# Patient Record
Sex: FEMALE | Race: WHITE | NOT HISPANIC OR LATINO | Employment: PART TIME | URBAN - METROPOLITAN AREA
[De-identification: names, ages, dates, MRNs, and addresses within clinical notes are randomized per-mention and may not be internally consistent; named-entity substitution may affect disease eponyms.]

---

## 2023-03-06 LAB
ALANINE AMINOTRANSFERASE (SGPT) (U/L) IN SER/PLAS: 16 U/L (ref 7–45)
ALBUMIN (G/DL) IN SER/PLAS: 4.3 G/DL (ref 3.4–5)
ASPARTATE AMINOTRANSFERASE (SGOT) (U/L) IN SER/PLAS: 18 U/L (ref 9–39)
BASOPHILS (10*3/UL) IN BLOOD BY AUTOMATED COUNT: 0.03 X10E9/L (ref 0–0.1)
BASOPHILS/100 LEUKOCYTES IN BLOOD BY AUTOMATED COUNT: 0.4 % (ref 0–2)
C REACTIVE PROTEIN (MG/L) IN SER/PLAS: 1.07 MG/DL
CREATININE (MG/DL) IN SER/PLAS: 1.04 MG/DL (ref 0.5–1.05)
EOSINOPHILS (10*3/UL) IN BLOOD BY AUTOMATED COUNT: 0.09 X10E9/L (ref 0–0.7)
EOSINOPHILS/100 LEUKOCYTES IN BLOOD BY AUTOMATED COUNT: 1.1 % (ref 0–6)
ERYTHROCYTE DISTRIBUTION WIDTH (RATIO) BY AUTOMATED COUNT: 13 % (ref 11.5–14.5)
ERYTHROCYTE MEAN CORPUSCULAR HEMOGLOBIN CONCENTRATION (G/DL) BY AUTOMATED: 32 G/DL (ref 32–36)
ERYTHROCYTE MEAN CORPUSCULAR VOLUME (FL) BY AUTOMATED COUNT: 93 FL (ref 80–100)
ERYTHROCYTES (10*6/UL) IN BLOOD BY AUTOMATED COUNT: 4.15 X10E12/L (ref 4–5.2)
GFR FEMALE: 58 ML/MIN/1.73M2
HEMATOCRIT (%) IN BLOOD BY AUTOMATED COUNT: 38.8 % (ref 36–46)
HEMOGLOBIN (G/DL) IN BLOOD: 12.4 G/DL (ref 12–16)
IMMATURE GRANULOCYTES/100 LEUKOCYTES IN BLOOD BY AUTOMATED COUNT: 1 % (ref 0–0.9)
LEUKOCYTES (10*3/UL) IN BLOOD BY AUTOMATED COUNT: 7.9 X10E9/L (ref 4.4–11.3)
LYMPHOCYTES (10*3/UL) IN BLOOD BY AUTOMATED COUNT: 1.4 X10E9/L (ref 1.2–4.8)
LYMPHOCYTES/100 LEUKOCYTES IN BLOOD BY AUTOMATED COUNT: 17.7 % (ref 13–44)
MONOCYTES (10*3/UL) IN BLOOD BY AUTOMATED COUNT: 0.6 X10E9/L (ref 0.1–1)
MONOCYTES/100 LEUKOCYTES IN BLOOD BY AUTOMATED COUNT: 7.6 % (ref 2–10)
NEUTROPHILS (10*3/UL) IN BLOOD BY AUTOMATED COUNT: 5.72 X10E9/L (ref 1.2–7.7)
NEUTROPHILS/100 LEUKOCYTES IN BLOOD BY AUTOMATED COUNT: 72.2 % (ref 40–80)
NRBC (PER 100 WBCS) BY AUTOMATED COUNT: 0 /100 WBC (ref 0–0)
PLATELETS (10*3/UL) IN BLOOD AUTOMATED COUNT: 233 X10E9/L (ref 150–450)
SEDIMENTATION RATE, ERYTHROCYTE: 54 MM/H (ref 0–30)
UREA NITROGEN (MG/DL) IN SER/PLAS: 27 MG/DL (ref 6–23)

## 2023-06-30 LAB
ALANINE AMINOTRANSFERASE (SGPT) (U/L) IN SER/PLAS: 14 U/L (ref 7–45)
ALBUMIN (G/DL) IN SER/PLAS: 4.2 G/DL (ref 3.4–5)
ALKALINE PHOSPHATASE (U/L) IN SER/PLAS: 103 U/L (ref 33–136)
ANION GAP IN SER/PLAS: 13 MMOL/L (ref 10–20)
ASPARTATE AMINOTRANSFERASE (SGOT) (U/L) IN SER/PLAS: 17 U/L (ref 9–39)
BASOPHILS (10*3/UL) IN BLOOD BY AUTOMATED COUNT: 0.05 X10E9/L (ref 0–0.1)
BASOPHILS/100 LEUKOCYTES IN BLOOD BY AUTOMATED COUNT: 0.5 % (ref 0–2)
BILIRUBIN TOTAL (MG/DL) IN SER/PLAS: 0.3 MG/DL (ref 0–1.2)
C REACTIVE PROTEIN (MG/L) IN SER/PLAS: 0.99 MG/DL
CALCIUM (MG/DL) IN SER/PLAS: 9.3 MG/DL (ref 8.6–10.6)
CARBON DIOXIDE, TOTAL (MMOL/L) IN SER/PLAS: 28 MMOL/L (ref 21–32)
CHLORIDE (MMOL/L) IN SER/PLAS: 103 MMOL/L (ref 98–107)
COMPLEMENT C3 (MG/DL) IN SER/PLAS: 161 MG/DL (ref 87–200)
COMPLEMENT C4 (MG/DL) IN SER/PLAS: 41 MG/DL (ref 10–50)
CREATININE (MG/DL) IN SER/PLAS: 1.14 MG/DL (ref 0.5–1.05)
EOSINOPHILS (10*3/UL) IN BLOOD BY AUTOMATED COUNT: 0.11 X10E9/L (ref 0–0.7)
EOSINOPHILS/100 LEUKOCYTES IN BLOOD BY AUTOMATED COUNT: 1 % (ref 0–6)
ERYTHROCYTE DISTRIBUTION WIDTH (RATIO) BY AUTOMATED COUNT: 13.2 % (ref 11.5–14.5)
ERYTHROCYTE MEAN CORPUSCULAR HEMOGLOBIN CONCENTRATION (G/DL) BY AUTOMATED: 30.8 G/DL (ref 32–36)
ERYTHROCYTE MEAN CORPUSCULAR VOLUME (FL) BY AUTOMATED COUNT: 97 FL (ref 80–100)
ERYTHROCYTES (10*6/UL) IN BLOOD BY AUTOMATED COUNT: 3.84 X10E12/L (ref 4–5.2)
GFR FEMALE: 52 ML/MIN/1.73M2
GLUCOSE (MG/DL) IN SER/PLAS: 105 MG/DL (ref 74–99)
HEMATOCRIT (%) IN BLOOD BY AUTOMATED COUNT: 37.3 % (ref 36–46)
HEMOGLOBIN (G/DL) IN BLOOD: 11.5 G/DL (ref 12–16)
IMMATURE GRANULOCYTES/100 LEUKOCYTES IN BLOOD BY AUTOMATED COUNT: 0.5 % (ref 0–0.9)
LEUKOCYTES (10*3/UL) IN BLOOD BY AUTOMATED COUNT: 11 X10E9/L (ref 4.4–11.3)
LYMPHOCYTES (10*3/UL) IN BLOOD BY AUTOMATED COUNT: 1.68 X10E9/L (ref 1.2–4.8)
LYMPHOCYTES/100 LEUKOCYTES IN BLOOD BY AUTOMATED COUNT: 15.3 % (ref 13–44)
MONOCYTES (10*3/UL) IN BLOOD BY AUTOMATED COUNT: 0.79 X10E9/L (ref 0.1–1)
MONOCYTES/100 LEUKOCYTES IN BLOOD BY AUTOMATED COUNT: 7.2 % (ref 2–10)
NEUTROPHILS (10*3/UL) IN BLOOD BY AUTOMATED COUNT: 8.31 X10E9/L (ref 1.2–7.7)
NEUTROPHILS/100 LEUKOCYTES IN BLOOD BY AUTOMATED COUNT: 75.5 % (ref 40–80)
NRBC (PER 100 WBCS) BY AUTOMATED COUNT: 0 /100 WBC (ref 0–0)
PLATELETS (10*3/UL) IN BLOOD AUTOMATED COUNT: 237 X10E9/L (ref 150–450)
POTASSIUM (MMOL/L) IN SER/PLAS: 4.4 MMOL/L (ref 3.5–5.3)
PROTEIN TOTAL: 7.1 G/DL (ref 6.4–8.2)
SODIUM (MMOL/L) IN SER/PLAS: 140 MMOL/L (ref 136–145)
UREA NITROGEN (MG/DL) IN SER/PLAS: 29 MG/DL (ref 6–23)

## 2023-07-01 LAB
CREATININE (MG/DL) IN URINE: 106 MG/DL (ref 20–320)
PROTEIN (MG/DL) IN URINE: 10 MG/DL (ref 5–24)
PROTEIN/CREATININE (MG/MG) IN URINE: 0.09 MG/MG CREAT (ref 0–0.17)
SEDIMENTATION RATE, ERYTHROCYTE: 36 MM/H (ref 0–30)

## 2023-07-02 LAB — ANTI-DNA (DS): 6 IU/ML

## 2023-07-06 LAB
6-ACETYLMORPHINE: <25 NG/ML
7-AMINOCLONAZEPAM: <25 NG/ML
ALPHA-HYDROXYALPRAZOLAM: <25 NG/ML
ALPHA-HYDROXYMIDAZOLAM: <25 NG/ML
ALPRAZOLAM: <25 NG/ML
AMPHETAMINE (PRESENCE) IN URINE BY SCREEN METHOD: ABNORMAL
BARBITURATES PRESENCE IN URINE BY SCREEN METHOD: ABNORMAL
CANNABINOIDS IN URINE BY SCREEN METHOD: ABNORMAL
CHLORDIAZEPOXIDE: <25 NG/ML
CLONAZEPAM: <25 NG/ML
COCAINE (PRESENCE) IN URINE BY SCREEN METHOD: ABNORMAL
CODEINE: <50 NG/ML
CREATINE, URINE FOR DRUG: 105.7 MG/DL
DIAZEPAM: <25 NG/ML
DRUG SCREEN COMMENT URINE: ABNORMAL
EDDP: <25 NG/ML
FENTANYL CONFIRMATION, URINE: <2.5 NG/ML
HYDROCODONE: 192 NG/ML
HYDROMORPHONE: 467 NG/ML
LORAZEPAM: <25 NG/ML
METHADONE CONFIRMATION,URINE: <25 NG/ML
MIDAZOLAM: <25 NG/ML
MORPHINE URINE: <50 NG/ML
NORDIAZEPAM: <25 NG/ML
NORFENTANYL: <2.5 NG/ML
NORHYDROCODONE: 908 NG/ML
NOROXYCODONE: <25 NG/ML
O-DESMETHYLTRAMADOL: <50 NG/ML
OXAZEPAM: <25 NG/ML
OXYCODONE: <25 NG/ML
OXYMORPHONE: <25 NG/ML
PHENCYCLIDINE (PRESENCE) IN URINE BY SCREEN METHOD: ABNORMAL
TEMAZEPAM: <25 NG/ML
TRAMADOL: <50 NG/ML
ZOLPIDEM METABOLITE (ZCA): <25 NG/ML
ZOLPIDEM: <25 NG/ML

## 2023-08-23 PROBLEM — I10 BENIGN ESSENTIAL HYPERTENSION: Status: ACTIVE | Noted: 2023-08-23

## 2023-08-23 PROBLEM — N18.31 STAGE 3A CHRONIC KIDNEY DISEASE (MULTI): Status: ACTIVE | Noted: 2023-08-23

## 2023-08-23 PROBLEM — M16.9 OSTEOARTHRITIS OF HIP: Status: ACTIVE | Noted: 2023-08-23

## 2023-08-23 PROBLEM — E66.01 CLASS 3 SEVERE OBESITY DUE TO EXCESS CALORIES WITH SERIOUS COMORBIDITY AND BODY MASS INDEX (BMI) OF 45.0 TO 49.9 IN ADULT (MULTI): Status: ACTIVE | Noted: 2023-08-23

## 2023-08-23 PROBLEM — E66.813 CLASS 3 SEVERE OBESITY DUE TO EXCESS CALORIES WITH SERIOUS COMORBIDITY AND BODY MASS INDEX (BMI) OF 45.0 TO 49.9 IN ADULT: Status: ACTIVE | Noted: 2023-08-23

## 2023-08-23 PROBLEM — L30.9 ECZEMA: Status: ACTIVE | Noted: 2023-08-23

## 2023-08-23 PROBLEM — M35.9 UNDIFFERENTIATED CONNECTIVE TISSUE DISEASE (MULTI): Status: ACTIVE | Noted: 2023-08-23

## 2023-08-23 RX ORDER — ASPIRIN 325 MG
1 TABLET, DELAYED RELEASE (ENTERIC COATED) ORAL
COMMUNITY
Start: 2021-05-17

## 2023-08-23 RX ORDER — HYDROCODONE BITARTRATE AND ACETAMINOPHEN 7.5; 325 MG/1; MG/1
1 TABLET ORAL EVERY 6 HOURS PRN
COMMUNITY
End: 2023-10-30 | Stop reason: SDUPTHER

## 2023-08-23 RX ORDER — MELOXICAM 15 MG/1
1 TABLET ORAL DAILY PRN
COMMUNITY
Start: 2021-05-20 | End: 2023-10-03 | Stop reason: ALTCHOICE

## 2023-08-23 RX ORDER — FERROUS SULFATE 325(65) MG
1 TABLET ORAL DAILY
COMMUNITY
Start: 2021-05-17 | End: 2023-10-03 | Stop reason: ALTCHOICE

## 2023-08-23 RX ORDER — HYDROCHLOROTHIAZIDE 25 MG/1
1 TABLET ORAL DAILY
COMMUNITY
End: 2024-01-04 | Stop reason: SDUPTHER

## 2023-08-23 RX ORDER — ETODOLAC 400 MG/1
400 TABLET, FILM COATED ORAL 2 TIMES DAILY PRN
COMMUNITY

## 2023-08-23 RX ORDER — TRIAMCINOLONE ACETONIDE 1 MG/G
1 CREAM TOPICAL
COMMUNITY
End: 2024-04-03 | Stop reason: SDUPTHER

## 2023-10-03 ENCOUNTER — OFFICE VISIT (OUTPATIENT)
Dept: RHEUMATOLOGY | Facility: CLINIC | Age: 68
End: 2023-10-03
Payer: MEDICARE

## 2023-10-03 VITALS
DIASTOLIC BLOOD PRESSURE: 90 MMHG | WEIGHT: 203.1 LBS | BODY MASS INDEX: 40.94 KG/M2 | HEIGHT: 59 IN | HEART RATE: 65 BPM | SYSTOLIC BLOOD PRESSURE: 155 MMHG | TEMPERATURE: 97.1 F

## 2023-10-03 DIAGNOSIS — M35.9 UNDIFFERENTIATED CONNECTIVE TISSUE DISEASE (MULTI): Primary | ICD-10-CM

## 2023-10-03 DIAGNOSIS — Z79.899 MEDICATION MANAGEMENT: ICD-10-CM

## 2023-10-03 DIAGNOSIS — D50.8 IRON DEFICIENCY ANEMIA SECONDARY TO INADEQUATE DIETARY IRON INTAKE: ICD-10-CM

## 2023-10-03 LAB
NON-UH HIE A/G RATIO: 1.3
NON-UH HIE ALB: 4.1 G/DL (ref 3.4–5)
NON-UH HIE ALK PHOS: 118 UNIT/L (ref 46–116)
NON-UH HIE BASO COUNT: 0.05 X1000 (ref 0–0.2)
NON-UH HIE BASOS %: 0.6 %
NON-UH HIE BILIRUBIN, TOTAL: 0.3 MG/DL (ref 0.2–1)
NON-UH HIE BUN/CREAT RATIO: 21.4
NON-UH HIE BUN: 30 MG/DL (ref 9–23)
NON-UH HIE C-REACTIVE PROTEIN, QUANTITATIVE: 1.8 MG/DL (ref 0–0.9)
NON-UH HIE CALCIUM: 9 MG/DL (ref 8.7–10.4)
NON-UH HIE CALCULATED OSMOLALITY: 285 MOSM/KG (ref 275–295)
NON-UH HIE CHLORIDE: 102 MMOL/L (ref 98–107)
NON-UH HIE CO2, VENOUS: 32 MMOL/L (ref 20–31)
NON-UH HIE CREATININE: 1.4 MG/DL (ref 0.5–0.8)
NON-UH HIE DIFF?: NO
NON-UH HIE EOS COUNT: 0.19 X1000 (ref 0–0.5)
NON-UH HIE EOSIN %: 2.2 %
NON-UH HIE GFR AA: 45
NON-UH HIE GLOBULIN: 3.2 G/DL
NON-UH HIE GLOMERULAR FILTRATION RATE: 37 ML/MIN/1.73M?
NON-UH HIE GLUCOSE: 92 MG/DL (ref 74–106)
NON-UH HIE GOT: 20 UNIT/L (ref 15–37)
NON-UH HIE GPT: 22 UNIT/L (ref 10–49)
NON-UH HIE HCT: 35.2 % (ref 36–46)
NON-UH HIE HGB: 11.7 G/DL (ref 12–16)
NON-UH HIE INSTR WBC: 8.6
NON-UH HIE IRON: 41 UG/DL (ref 40–170)
NON-UH HIE K: 4.3 MMOL/L (ref 3.5–5.1)
NON-UH HIE LYMPH %: 15.3 %
NON-UH HIE LYMPH COUNT: 1.32 X1000 (ref 1.2–4.8)
NON-UH HIE MCH: 30.6 PG (ref 27–34)
NON-UH HIE MCHC: 33.2 G/DL (ref 32–37)
NON-UH HIE MCV: 92.2 FL (ref 80–100)
NON-UH HIE MONO %: 10.8 %
NON-UH HIE MONO COUNT: 0.93 X1000 (ref 0.1–1)
NON-UH HIE MPV: 7.2 FL (ref 7.4–10.4)
NON-UH HIE NA: 140 MMOL/L (ref 135–145)
NON-UH HIE NEUTROPHIL %: 71 %
NON-UH HIE NEUTROPHIL COUNT (ANC): 6.13 X1000 (ref 1.4–8.8)
NON-UH HIE NUCLEATED RBC: 0 /100WBC
NON-UH HIE PLATELET: 235 X10 (ref 150–450)
NON-UH HIE RBC: 3.82 X10 (ref 4.2–5.4)
NON-UH HIE RDW: 13.3 % (ref 11.5–14.5)
NON-UH HIE SATURATION: 14.4 % (ref 20–50)
NON-UH HIE SED RATE WESTERGREN: 37 MM/HR (ref 0–30)
NON-UH HIE TIBC: 285 UG/ML (ref 250–425)
NON-UH HIE TOTAL PROTEIN: 7.3 G/DL (ref 5.7–8.2)
NON-UH HIE WBC: 8.6 X10 (ref 4.5–11)

## 2023-10-03 PROCEDURE — 1036F TOBACCO NON-USER: CPT | Performed by: INTERNAL MEDICINE

## 2023-10-03 PROCEDURE — 1125F AMNT PAIN NOTED PAIN PRSNT: CPT | Performed by: INTERNAL MEDICINE

## 2023-10-03 PROCEDURE — 3077F SYST BP >= 140 MM HG: CPT | Performed by: INTERNAL MEDICINE

## 2023-10-03 PROCEDURE — 99214 OFFICE O/P EST MOD 30 MIN: CPT | Performed by: INTERNAL MEDICINE

## 2023-10-03 PROCEDURE — 3080F DIAST BP >= 90 MM HG: CPT | Performed by: INTERNAL MEDICINE

## 2023-10-03 ASSESSMENT — ENCOUNTER SYMPTOMS
BRUISES/BLEEDS EASILY: 0
ARTHRALGIAS: 1
SHORTNESS OF BREATH: 0
JOINT SWELLING: 1
NUMBNESS: 0
DIZZINESS: 0
MYALGIAS: 1
UNEXPECTED WEIGHT CHANGE: 0
COUGH: 0
COLOR CHANGE: 0
WEAKNESS: 0
FEVER: 0
BACK PAIN: 0
HEADACHES: 0

## 2023-10-03 ASSESSMENT — PATIENT HEALTH QUESTIONNAIRE - PHQ9
1. LITTLE INTEREST OR PLEASURE IN DOING THINGS: NOT AT ALL
SUM OF ALL RESPONSES TO PHQ9 QUESTIONS 1 AND 2: 0
2. FEELING DOWN, DEPRESSED OR HOPELESS: NOT AT ALL

## 2023-10-03 NOTE — ASSESSMENT & PLAN NOTE
Pt stable on meds   Improved with NSAID.  Pt reports she stopped iron and fatigue improved.  Labs ordered to assess control and disease activity

## 2023-10-03 NOTE — PROGRESS NOTES
OARRS:  Jesusita Gusman MD on 10/3/2023  4:38 PM  I have personally reviewed the OARRS report for Randee JOSE ALBERTO Munoz. I have considered the risks of abuse, dependence, addiction and diversion    Is the patient prescribed a combination of a benzodiazepine and opioid?  Yes, I feel it is clincially indicated to continue the medication and have discussed with the patient risks/benefits/alternatives.    Last Urine Drug Screen / ordered today: Yes  Recent Results (from the past 8760 hour(s))   OPIATE/OPIOID/BENZO PRESCRIPTION COMPLIANCE    Collection Time: 06/30/23  3:48 PM   Result Value Ref Range    DRUG SCREEN COMMENT URINE SEE BELOW     Creatine, Urine 105.7 mg/dL    Amphetamine Screen, Urine PRESUMPTIVE NEGATIVE NEGATIVE    Barbiturate Screen, Urine PRESUMPTIVE NEGATIVE NEGATIVE    Cannabinoid Screen, Urine PRESUMPTIVE NEGATIVE NEGATIVE    Cocaine Screen, Urine PRESUMPTIVE NEGATIVE NEGATIVE    PCP Screen, Urine PRESUMPTIVE NEGATIVE NEGATIVE    7-Aminoclonazepam <25 Cutoff <25 ng/mL    Alpha-Hydroxyalprazolam <25 Cutoff <25 ng/mL    Alpha-Hydroxymidazolam <25 Cutoff <25 ng/mL    Alprazolam <25 Cutoff <25 ng/mL    Chlordiazepoxide <25 Cutoff <25 ng/mL    Clonazepam <25 Cutoff <25 ng/mL    Diazepam <25 Cutoff <25 ng/mL    Lorazepam <25 Cutoff <25 ng/mL    Midazolam <25 Cutoff <25 ng/mL    Nordiazepam <25 Cutoff <25 ng/mL    Oxazepam <25 Cutoff <25 ng/mL    Temazepam <25 Cutoff <25 ng/mL    Zolpidem <25 Cutoff <25 ng/mL    Zolpidem Metabolite (ZCA) <25 Cutoff <25 ng/mL    6-Acetylmorphine <25 Cutoff <25 ng/mL    Codeine <50 Cutoff <50 ng/mL    Hydrocodone 192 (A) Cutoff <25 ng/mL    Hydromorphone 467 (A) Cutoff <25 ng/mL    Morphine Urine <50 Cutoff <50 ng/mL    Norhydrocodone 908 (A) Cutoff <25 ng/mL    Noroxycodone <25 Cutoff <25 ng/mL    Oxycodone <25 Cutoff <25 ng/mL    Oxymorphone <25 Cutoff <25 ng/mL    Tramadol <50 Cutoff <50 ng/mL    O-Desmethyltramadol <50 Cutoff <50 ng/mL    Fentanyl <2.5 Cutoff<2.5 ng/mL     Norfentanyl <2.5 Cutoff<2.5 ng/mL    METHADONE CONFIRMATION,URINE <25 Cutoff <25 ng/mL    EDDP <25 Cutoff <25 ng/mL     Results are as expected.         Controlled Substance Agreement:  Date of the Last Agreement: today  Reviewed Controlled Substance Agreement including but not limited to the benefits, risks, and alternatives to treatment with a Controlled Substance medication(s).    Opioids:  What is the patient's goal of therapy? Pain relief  Is this being achieved with current treatment? yes    I have calculated the patient's Morphine Dose Equivalent (MED):   I have considered referral to Pain Management and/or a specialist, and do not feel it is necessary at this time.    I feel that it is clinically indicated to continue this current medication regimen after consideration of alternative therapies, and other non-opioid treatment.    Opioid Risk Screening:  low risk screen  No data recorded    Pain Assessment:  Pain on average over last week 5  Pain at worst over last week 6  %pain relieved in last week by meds 80%  Meds make a difference in life?  Yes  MD attest to same-yes  Improved physical and overall functioning, family and social relationships, sleep and mood patterns  No side effects  No data recorded    Subjective   Patient ID: Randee Munoz is a 68 y.o. female who presents for UCTD (Here for follow up UCTD ).  HPI pt reports that overall she feels better.  Meds are helping New NSAID is more effective at controlling her stiffness.  Has some nerve pain in legs but not severe    Review of Systems   Constitutional:  Negative for fever and unexpected weight change.   HENT:  Negative for congestion.    Respiratory:  Negative for cough and shortness of breath.    Cardiovascular:  Negative for leg swelling.   Musculoskeletal:  Positive for arthralgias, joint swelling and myalgias. Negative for back pain.   Skin:  Negative for color change and rash.   Neurological:  Negative for dizziness, weakness,  "numbness and headaches.   Hematological:  Does not bruise/bleed easily.       Objective       3/23/2022     2:32 PM 6/20/2022     9:17 AM 9/23/2022     2:43 PM 12/16/2022     9:35 AM 3/6/2023     7:47 AM 6/30/2023     2:58 PM 10/3/2023     4:16 PM   Vitals   Systolic 153 176 163 184 163 158 155   Diastolic 75 77 71 83 79 76 90   Heart Rate 65 52 66 66 76 68 65   Temp 36.1 °C (97 °F) 36.1 °C (97 °F) 36.2 °C (97.2 °F) 36.5 °C (97.7 °F) 36.6 °C (97.8 °F) 36.4 °C (97.6 °F) 36.2 °C (97.1 °F)   Resp 16 16 18  18     Height (in)  1.422 m (4' 8\") 1.422 m (4' 8\")  1.422 m (4' 8\")  1.499 m (4' 11\")   Weight (lb) 199.38 198.25 199 195.56 200.44 203.25 203.1   BMI 44.7 kg/m2 44.45 kg/m2 44.61 kg/m2 43.84 kg/m2 44.94 kg/m2 45.57 kg/m2 41.02 kg/m2   BSA (m2) 1.89 m2 1.88 m2 1.89 m2 1.87 m2 1.9 m2 1.91 m2 1.96 m2   Visit Report       Report       Physical Exam  Vitals reviewed.   Constitutional:       General: She is not in acute distress.     Appearance: Normal appearance.   HENT:      Head: Normocephalic and atraumatic.   Eyes:      Conjunctiva/sclera: Conjunctivae normal.   Pulmonary:      Effort: Pulmonary effort is normal.   Musculoskeletal:         General: No swelling, tenderness or deformity.      Right lower leg: No edema.      Left lower leg: No edema.      Comments: OA changes in hands but no synovitis   Skin:     Findings: No erythema or rash.   Neurological:      General: No focal deficit present.      Mental Status: She is alert.   Psychiatric:         Mood and Affect: Mood normal.         Assessment/Plan   Problem List Items Addressed This Visit             ICD-10-CM    Undifferentiated connective tissue disease (CMS/HCC) - Primary M35.9     Pt stable on meds   Improved with NSAID.  Pt reports she stopped iron and fatigue improved.  Labs ordered to assess control and disease activity         Relevant Orders    CBC and Auto Differential    Comprehensive Metabolic Panel    C-Reactive Protein    Sedimentation Rate    " Iron and TIBC    Medication management Z79.899     Pt is on opioid.  Will continue to monitor          Other Visit Diagnoses         Codes    Iron deficiency anemia secondary to inadequate dietary iron intake     D50.8    Relevant Orders    Iron and TIBC

## 2023-10-03 NOTE — LETTER
October 3, 2023     Maryann Sewell DO  [address] [facility] [address]    Patient: Randee Munoz   YOB: 1955   Date of Visit: 10/3/2023       Dear Dr. Maryann Sewell DO:    Thank you for referring Randee Munoz to me for evaluation. Below are my notes for this consultation.  If you have questions, please do not hesitate to call me. I look forward to following your patient along with you.       Sincerely,     Jesusita Gusman MD      CC: No Recipients  ______________________________________________________________________________________    OARRS:  Jesusita Gusman MD on 10/3/2023  4:38 PM  I have personally reviewed the OARRS report for Randee Munoz. I have considered the risks of abuse, dependence, addiction and diversion    Is the patient prescribed a combination of a benzodiazepine and opioid?  Yes, I feel it is clincially indicated to continue the medication and have discussed with the patient risks/benefits/alternatives.    Last Urine Drug Screen / ordered today: Yes  Recent Results (from the past 8760 hour(s))   OPIATE/OPIOID/BENZO PRESCRIPTION COMPLIANCE    Collection Time: 06/30/23  3:48 PM   Result Value Ref Range    DRUG SCREEN COMMENT URINE SEE BELOW     Creatine, Urine 105.7 mg/dL    Amphetamine Screen, Urine PRESUMPTIVE NEGATIVE NEGATIVE    Barbiturate Screen, Urine PRESUMPTIVE NEGATIVE NEGATIVE    Cannabinoid Screen, Urine PRESUMPTIVE NEGATIVE NEGATIVE    Cocaine Screen, Urine PRESUMPTIVE NEGATIVE NEGATIVE    PCP Screen, Urine PRESUMPTIVE NEGATIVE NEGATIVE    7-Aminoclonazepam <25 Cutoff <25 ng/mL    Alpha-Hydroxyalprazolam <25 Cutoff <25 ng/mL    Alpha-Hydroxymidazolam <25 Cutoff <25 ng/mL    Alprazolam <25 Cutoff <25 ng/mL    Chlordiazepoxide <25 Cutoff <25 ng/mL    Clonazepam <25 Cutoff <25 ng/mL    Diazepam <25 Cutoff <25 ng/mL    Lorazepam <25 Cutoff <25 ng/mL    Midazolam <25 Cutoff <25 ng/mL    Nordiazepam <25 Cutoff <25 ng/mL    Oxazepam  <25 Cutoff <25 ng/mL    Temazepam <25 Cutoff <25 ng/mL    Zolpidem <25 Cutoff <25 ng/mL    Zolpidem Metabolite (ZCA) <25 Cutoff <25 ng/mL    6-Acetylmorphine <25 Cutoff <25 ng/mL    Codeine <50 Cutoff <50 ng/mL    Hydrocodone 192 (A) Cutoff <25 ng/mL    Hydromorphone 467 (A) Cutoff <25 ng/mL    Morphine Urine <50 Cutoff <50 ng/mL    Norhydrocodone 908 (A) Cutoff <25 ng/mL    Noroxycodone <25 Cutoff <25 ng/mL    Oxycodone <25 Cutoff <25 ng/mL    Oxymorphone <25 Cutoff <25 ng/mL    Tramadol <50 Cutoff <50 ng/mL    O-Desmethyltramadol <50 Cutoff <50 ng/mL    Fentanyl <2.5 Cutoff<2.5 ng/mL    Norfentanyl <2.5 Cutoff<2.5 ng/mL    METHADONE CONFIRMATION,URINE <25 Cutoff <25 ng/mL    EDDP <25 Cutoff <25 ng/mL     Results are as expected.         Controlled Substance Agreement:  Date of the Last Agreement: today  Reviewed Controlled Substance Agreement including but not limited to the benefits, risks, and alternatives to treatment with a Controlled Substance medication(s).    Opioids:  What is the patient's goal of therapy? Pain relief  Is this being achieved with current treatment? yes    I have calculated the patient's Morphine Dose Equivalent (MED):   I have considered referral to Pain Management and/or a specialist, and do not feel it is necessary at this time.    I feel that it is clinically indicated to continue this current medication regimen after consideration of alternative therapies, and other non-opioid treatment.    Opioid Risk Screening:  low risk screen  No data recorded    Pain Assessment:  Pain on average over last week 5  Pain at worst over last week 6  %pain relieved in last week by meds 80%  Meds make a difference in life?  Yes  MD attest to same-yes  Improved physical and overall functioning, family and social relationships, sleep and mood patterns  No side effects  No data recorded    Subjective  Patient ID: Randee Munoz is a 68 y.o. female who presents for UCTD (Here for follow up UCTD ).  HPI  "pt reports that overall she feels better.  Meds are helping New NSAID is more effective at controlling her stiffness.  Has some nerve pain in legs but not severe    Review of Systems   Constitutional:  Negative for fever and unexpected weight change.   HENT:  Negative for congestion.    Respiratory:  Negative for cough and shortness of breath.    Cardiovascular:  Negative for leg swelling.   Musculoskeletal:  Positive for arthralgias, joint swelling and myalgias. Negative for back pain.   Skin:  Negative for color change and rash.   Neurological:  Negative for dizziness, weakness, numbness and headaches.   Hematological:  Does not bruise/bleed easily.       Objective      3/23/2022     2:32 PM 6/20/2022     9:17 AM 9/23/2022     2:43 PM 12/16/2022     9:35 AM 3/6/2023     7:47 AM 6/30/2023     2:58 PM 10/3/2023     4:16 PM   Vitals   Systolic 153 176 163 184 163 158 155   Diastolic 75 77 71 83 79 76 90   Heart Rate 65 52 66 66 76 68 65   Temp 36.1 °C (97 °F) 36.1 °C (97 °F) 36.2 °C (97.2 °F) 36.5 °C (97.7 °F) 36.6 °C (97.8 °F) 36.4 °C (97.6 °F) 36.2 °C (97.1 °F)   Resp 16 16 18  18     Height (in)  1.422 m (4' 8\") 1.422 m (4' 8\")  1.422 m (4' 8\")  1.499 m (4' 11\")   Weight (lb) 199.38 198.25 199 195.56 200.44 203.25 203.1   BMI 44.7 kg/m2 44.45 kg/m2 44.61 kg/m2 43.84 kg/m2 44.94 kg/m2 45.57 kg/m2 41.02 kg/m2   BSA (m2) 1.89 m2 1.88 m2 1.89 m2 1.87 m2 1.9 m2 1.91 m2 1.96 m2   Visit Report       Report       Physical Exam  Vitals reviewed.   Constitutional:       General: She is not in acute distress.     Appearance: Normal appearance.   HENT:      Head: Normocephalic and atraumatic.   Eyes:      Conjunctiva/sclera: Conjunctivae normal.   Pulmonary:      Effort: Pulmonary effort is normal.   Musculoskeletal:         General: No swelling, tenderness or deformity.      Right lower leg: No edema.      Left lower leg: No edema.      Comments: OA changes in hands but no synovitis   Skin:     Findings: No erythema or rash. "   Neurological:      General: No focal deficit present.      Mental Status: She is alert.   Psychiatric:         Mood and Affect: Mood normal.         Assessment/Plan  Problem List Items Addressed This Visit             ICD-10-CM    Undifferentiated connective tissue disease (CMS/HCC) - Primary M35.9     Pt stable on meds   Improved with NSAID.  Pt reports she stopped iron and fatigue improved.  Labs ordered to assess control and disease activity         Relevant Orders    CBC and Auto Differential    Comprehensive Metabolic Panel    C-Reactive Protein    Sedimentation Rate    Iron and TIBC    Medication management Z79.899     Pt is on opioid.  Will continue to monitor          Other Visit Diagnoses         Codes    Iron deficiency anemia secondary to inadequate dietary iron intake     D50.8    Relevant Orders    Iron and TIBC

## 2023-10-30 ENCOUNTER — TELEPHONE (OUTPATIENT)
Dept: RHEUMATOLOGY | Facility: CLINIC | Age: 68
End: 2023-10-30
Payer: MEDICARE

## 2023-10-30 DIAGNOSIS — M35.9 UNDIFFERENTIATED CONNECTIVE TISSUE DISEASE (MULTI): Primary | ICD-10-CM

## 2023-10-30 RX ORDER — HYDROCODONE BITARTRATE AND ACETAMINOPHEN 7.5; 325 MG/1; MG/1
1 TABLET ORAL EVERY 6 HOURS PRN
Qty: 120 TABLET | Refills: 0 | Status: SHIPPED | OUTPATIENT
Start: 2023-10-30 | End: 2023-12-04 | Stop reason: SDUPTHER

## 2023-12-04 ENCOUNTER — TELEPHONE (OUTPATIENT)
Dept: RHEUMATOLOGY | Facility: CLINIC | Age: 68
End: 2023-12-04
Payer: MEDICARE

## 2023-12-04 DIAGNOSIS — M35.9 UNDIFFERENTIATED CONNECTIVE TISSUE DISEASE (MULTI): ICD-10-CM

## 2023-12-04 RX ORDER — HYDROCODONE BITARTRATE AND ACETAMINOPHEN 7.5; 325 MG/1; MG/1
1 TABLET ORAL EVERY 6 HOURS PRN
Qty: 120 TABLET | Refills: 0 | Status: SHIPPED | OUTPATIENT
Start: 2023-12-04 | End: 2023-12-28 | Stop reason: SDUPTHER

## 2023-12-28 ENCOUNTER — LAB (OUTPATIENT)
Dept: LAB | Facility: LAB | Age: 68
End: 2023-12-28
Payer: MEDICARE

## 2023-12-28 ENCOUNTER — OFFICE VISIT (OUTPATIENT)
Dept: RHEUMATOLOGY | Facility: CLINIC | Age: 68
End: 2023-12-28
Payer: MEDICARE

## 2023-12-28 VITALS
BODY MASS INDEX: 40.92 KG/M2 | SYSTOLIC BLOOD PRESSURE: 162 MMHG | WEIGHT: 203 LBS | DIASTOLIC BLOOD PRESSURE: 84 MMHG | HEART RATE: 62 BPM | TEMPERATURE: 97.2 F | HEIGHT: 59 IN

## 2023-12-28 DIAGNOSIS — N18.31 STAGE 3A CHRONIC KIDNEY DISEASE (MULTI): ICD-10-CM

## 2023-12-28 DIAGNOSIS — M16.12 PRIMARY OSTEOARTHRITIS OF LEFT HIP: ICD-10-CM

## 2023-12-28 DIAGNOSIS — D50.8 IRON DEFICIENCY ANEMIA SECONDARY TO INADEQUATE DIETARY IRON INTAKE: ICD-10-CM

## 2023-12-28 DIAGNOSIS — M35.9 UNDIFFERENTIATED CONNECTIVE TISSUE DISEASE (MULTI): ICD-10-CM

## 2023-12-28 DIAGNOSIS — M35.9 UNDIFFERENTIATED CONNECTIVE TISSUE DISEASE (MULTI): Primary | ICD-10-CM

## 2023-12-28 DIAGNOSIS — Z79.899 MEDICATION MANAGEMENT: ICD-10-CM

## 2023-12-28 DIAGNOSIS — E66.01 CLASS 3 SEVERE OBESITY DUE TO EXCESS CALORIES WITH SERIOUS COMORBIDITY AND BODY MASS INDEX (BMI) OF 40.0 TO 44.9 IN ADULT (MULTI): ICD-10-CM

## 2023-12-28 PROBLEM — M16.9 OSTEOARTHRITIS OF HIP: Status: RESOLVED | Noted: 2023-08-23 | Resolved: 2023-12-28

## 2023-12-28 PROCEDURE — 85025 COMPLETE CBC W/AUTO DIFF WBC: CPT

## 2023-12-28 PROCEDURE — 1125F AMNT PAIN NOTED PAIN PRSNT: CPT | Performed by: INTERNAL MEDICINE

## 2023-12-28 PROCEDURE — 3077F SYST BP >= 140 MM HG: CPT | Performed by: INTERNAL MEDICINE

## 2023-12-28 PROCEDURE — 83540 ASSAY OF IRON: CPT

## 2023-12-28 PROCEDURE — 1159F MED LIST DOCD IN RCRD: CPT | Performed by: INTERNAL MEDICINE

## 2023-12-28 PROCEDURE — 86235 NUCLEAR ANTIGEN ANTIBODY: CPT

## 2023-12-28 PROCEDURE — 3008F BODY MASS INDEX DOCD: CPT | Performed by: INTERNAL MEDICINE

## 2023-12-28 PROCEDURE — 1160F RVW MEDS BY RX/DR IN RCRD: CPT | Performed by: INTERNAL MEDICINE

## 2023-12-28 PROCEDURE — 80053 COMPREHEN METABOLIC PANEL: CPT

## 2023-12-28 PROCEDURE — 3079F DIAST BP 80-89 MM HG: CPT | Performed by: INTERNAL MEDICINE

## 2023-12-28 PROCEDURE — 99214 OFFICE O/P EST MOD 30 MIN: CPT | Performed by: INTERNAL MEDICINE

## 2023-12-28 PROCEDURE — 83550 IRON BINDING TEST: CPT

## 2023-12-28 PROCEDURE — 86140 C-REACTIVE PROTEIN: CPT

## 2023-12-28 PROCEDURE — 86038 ANTINUCLEAR ANTIBODIES: CPT

## 2023-12-28 PROCEDURE — 85652 RBC SED RATE AUTOMATED: CPT

## 2023-12-28 PROCEDURE — 36415 COLL VENOUS BLD VENIPUNCTURE: CPT

## 2023-12-28 PROCEDURE — 1036F TOBACCO NON-USER: CPT | Performed by: INTERNAL MEDICINE

## 2023-12-28 PROCEDURE — 86225 DNA ANTIBODY NATIVE: CPT

## 2023-12-28 RX ORDER — HYDROCODONE BITARTRATE AND ACETAMINOPHEN 7.5; 325 MG/1; MG/1
1 TABLET ORAL EVERY 6 HOURS PRN
Qty: 120 TABLET | Refills: 0 | Status: SHIPPED | OUTPATIENT
Start: 2024-01-03 | End: 2024-02-07 | Stop reason: SDUPTHER

## 2023-12-28 ASSESSMENT — ENCOUNTER SYMPTOMS
ARTHRALGIAS: 1
BACK PAIN: 0
FEVER: 0
SHORTNESS OF BREATH: 0
NUMBNESS: 0
MYALGIAS: 0
JOINT SWELLING: 1
COLOR CHANGE: 0
WEAKNESS: 0
FATIGUE: 0
COUGH: 0

## 2023-12-28 ASSESSMENT — PATIENT HEALTH QUESTIONNAIRE - PHQ9
2. FEELING DOWN, DEPRESSED OR HOPELESS: NOT AT ALL
SUM OF ALL RESPONSES TO PHQ9 QUESTIONS 1 AND 2: 0
1. LITTLE INTEREST OR PLEASURE IN DOING THINGS: NOT AT ALL

## 2023-12-28 NOTE — PROGRESS NOTES
Subjective   Patient ID: Randee Munoz is a 68 y.o. female who presents for uctd.  Re: UCTD  Pt overall is stable  No worsening rashes or fatigue.   Recovered well from DANICA but now having a lot of L knee pain with inactivity. Feels better after she takes a couple of steps.   Notes L knee is more swollen than R      OARRS:  Jesusita Gusman MD on 12/28/2023  3:16 PM  I have personally reviewed the OARRS report for Randee Munoz. I have considered the risks of abuse, dependence, addiction and diversion and I believe that it is clinically appropriate for Randee Munoz to be prescribed this medication    Is the patient prescribed a combination of a benzodiazepine and opioid?  No    Last Urine Drug Screen / ordered today: Yes  Recent Results (from the past 8760 hour(s))   OPIATE/OPIOID/BENZO PRESCRIPTION COMPLIANCE    Collection Time: 06/30/23  3:48 PM   Result Value Ref Range    DRUG SCREEN COMMENT URINE SEE BELOW     Creatine, Urine 105.7 mg/dL    Amphetamine Screen, Urine PRESUMPTIVE NEGATIVE NEGATIVE    Barbiturate Screen, Urine PRESUMPTIVE NEGATIVE NEGATIVE    Cannabinoid Screen, Urine PRESUMPTIVE NEGATIVE NEGATIVE    Cocaine Screen, Urine PRESUMPTIVE NEGATIVE NEGATIVE    PCP Screen, Urine PRESUMPTIVE NEGATIVE NEGATIVE    7-Aminoclonazepam <25 Cutoff <25 ng/mL    Alpha-Hydroxyalprazolam <25 Cutoff <25 ng/mL    Alpha-Hydroxymidazolam <25 Cutoff <25 ng/mL    Alprazolam <25 Cutoff <25 ng/mL    Chlordiazepoxide <25 Cutoff <25 ng/mL    Clonazepam <25 Cutoff <25 ng/mL    Diazepam <25 Cutoff <25 ng/mL    Lorazepam <25 Cutoff <25 ng/mL    Midazolam <25 Cutoff <25 ng/mL    Nordiazepam <25 Cutoff <25 ng/mL    Oxazepam <25 Cutoff <25 ng/mL    Temazepam <25 Cutoff <25 ng/mL    Zolpidem <25 Cutoff <25 ng/mL    Zolpidem Metabolite (ZCA) <25 Cutoff <25 ng/mL    6-Acetylmorphine <25 Cutoff <25 ng/mL    Codeine <50 Cutoff <50 ng/mL    Hydrocodone 192 (A) Cutoff <25 ng/mL    Hydromorphone 467 (A) Cutoff <25 ng/mL     Morphine Urine <50 Cutoff <50 ng/mL    Norhydrocodone 908 (A) Cutoff <25 ng/mL    Noroxycodone <25 Cutoff <25 ng/mL    Oxycodone <25 Cutoff <25 ng/mL    Oxymorphone <25 Cutoff <25 ng/mL    Tramadol <50 Cutoff <50 ng/mL    O-Desmethyltramadol <50 Cutoff <50 ng/mL    Fentanyl <2.5 Cutoff<2.5 ng/mL    Norfentanyl <2.5 Cutoff<2.5 ng/mL    METHADONE CONFIRMATION,URINE <25 Cutoff <25 ng/mL    EDDP <25 Cutoff <25 ng/mL     Results are as expected.         Controlled Substance Agreement:  Date of the Last Agreement: 10/3/23  Reviewed Controlled Substance Agreement including but not limited to the benefits, risks, and alternatives to treatment with a Controlled Substance medication(s).    Opioids:  What is the patient's goal of therapy? Pain relief  Is this being achieved with current treatment? y    I have calculated the patient's Morphine Dose Equivalent (MED):   I have considered referral to Pain Management and/or a specialist, and do not feel it is necessary at this time.    I feel that it is clinically indicated to continue this current medication regimen after consideration of alternative therapies, and other non-opioid treatment.    Opioid Risk Screening:  No increased risk    Pain Assessment:  Controlled substance questionnaire    On scale of 0-10  -pain on average over the last week? 6  -pain at its worst over the last week? 8  %pain relieved by meds? 100  Amount of pain relief with meds make a difference? y  MD agrees with efficacy of med? y    Better/same/worse  Physical functioning better  Family relationships better  Social relationships better   Mood better    Sleep pattern better  Overall functioning better  Side effects? no  Patient Active Problem List    Diagnosis Date Noted    Medication management 10/03/2023    Benign essential hypertension 08/23/2023    Stage 3a chronic kidney disease (CMS/HCC) 08/23/2023    Undifferentiated connective tissue disease (CMS/HCC) 08/23/2023    Class 3 severe obesity due to  "excess calories with serious comorbidity and body mass index (BMI) of 40.0 to 44.9 in adult (CMS/[facility]) 08/23/2023    Eczema 08/23/2023     Past Medical History:   Diagnosis Date    COVID-19 12/29/2021    COVID-19 virus infection    Osteoarthritis of hip 08/23/2023    Rosacea     Trochanteric bursitis, left hip 10/16/2017    Trochanteric bursitis of left hip    Unspecified fracture of lower end of unspecified humerus, initial encounter for closed fracture     Elbow fracture     Current Outpatient Medications   Medication Instructions    cholecalciferol (Vitamin D-3) 1,250 mcg (50,000 unit) capsule 1 capsule, oral, Weekly    etodolac (LODINE) 400 mg, oral, 2 times daily PRN    hydroCHLOROthiazide (HYDRODiuril) 25 mg tablet 1 tablet, oral, Daily    [START ON 1/3/2024] HYDROcodone-acetaminophen (Norco) 7.5-325 mg tablet 1 tablet, oral, Every 6 hours PRN, pain    triamcinolone (Kenalog) 0.1 % cream 1 Application, Topical, 2 or 3 times daily     Allergies   Allergen Reactions    Sulfa (Sulfonamide Antibiotics) Hives    Aspirin Other     sensitive    Opioids - Morphine Analogues Nausea/vomiting    Clindamycin Hives and Rash         Review of Systems   Constitutional:  Negative for fatigue and fever.   Respiratory:  Negative for cough and shortness of breath.    Cardiovascular:  Negative for leg swelling.   Musculoskeletal:  Positive for arthralgias, gait problem and joint swelling. Negative for back pain and myalgias.   Skin:  Negative for color change and rash.   Neurological:  Negative for weakness and numbness.       Objective  /84   Pulse 62   Temp 36.2 °C (97.2 °F)   Ht 1.499 m (4' 11\")   Wt 92.1 kg (203 lb)   BMI 41.00 kg/m²     Physical Exam  Vitals reviewed.   Constitutional:       General: She is not in acute distress.     Appearance: Normal appearance.   HENT:      Head: Normocephalic and atraumatic.   Eyes:      Conjunctiva/sclera: Conjunctivae normal.   Pulmonary:      Effort: Pulmonary effort is " normal. No respiratory distress.   Musculoskeletal:         General: No swelling, tenderness or deformity.      Cervical back: Normal range of motion.      Right lower leg: No edema.      Left lower leg: No edema.      Comments: Walks with limp  No synovitis of upper extremity joints.  L knee with soft tissue swelling near suprapatellar area.  +medial joint line tenderness   Skin:     Coloration: Skin is not pale.      Findings: No erythema or rash.   Neurological:      General: No focal deficit present.      Mental Status: She is alert and oriented to person, place, and time. Mental status is at baseline.      Gait: Gait abnormal.   Psychiatric:         Mood and Affect: Mood normal.         Assessment/Plan   Problem List Items Addressed This Visit             ICD-10-CM    RESOLVED: Osteoarthritis of hip M16.9    Stage 3a chronic kidney disease (CMS/[facility]) N18.31     Chronic Condition Documentation: Stable based on symptoms and exam. Continue      established treatment plan and follow-up at least yearly.           Undifferentiated connective tissue disease (CMS/[facility]) - Primary M35.9     UCTD on NSAID therapy.  Doesn't meet criteria for lupus.   Has L knee pain and most likely has OA of knee.   Imaging studies ordered.  Labs ordered today to monitor for increased disease activity, end organ manifestations and to monitor for any drug toxicities due to chronic medications           Relevant Medications    HYDROcodone-acetaminophen (Norco) 7.5-325 mg tablet (Start on 1/3/2024)    Other Relevant Orders    ARIC with Reflex to MARY    XR knee left 3 views    Class 3 severe obesity due to excess calories with serious comorbidity and body mass index (BMI) of 40.0 to 44.9 in adult (CMS/[facility]) E66.01, Z68.41    Medication management Z79.899     On chronic opioid.  Will continue to monitor usage  CSA done 10/3/23          Follow up 3 mo       Jesusita Gusman MD 12/28/23 3:34 PM

## 2023-12-28 NOTE — ASSESSMENT & PLAN NOTE
Chronic Condition Documentation: Stable based on symptoms and exam. Continue      established treatment plan and follow-up at least yearly.

## 2023-12-28 NOTE — ASSESSMENT & PLAN NOTE
UCTD on NSAID therapy.  Doesn't meet criteria for lupus.   Has L knee pain and most likely has OA of knee.   Imaging studies ordered.  Labs ordered today to monitor for increased disease activity, end organ manifestations and to monitor for any drug toxicities due to chronic medications

## 2023-12-28 NOTE — PATIENT INSTRUCTIONS
It was a pleasure to see you today  Please call if your symptoms worsen  Please review your summary for education and reminders.  Follow up at your next appointment.    If you had labs/xrays done today, you will be able to view on [employer].   We will contact you when the results are reviewed for further discussion.  Please note that you may receive your results before I have had a chance to review.  Please know I will be contacting you for discussion  Homegoing instructions for all patient  A healthy lifestyle helps chronic diseases  These are all the goals you should strive to improve your overall health   Blood pressure <130/85   BMI of <30 or waist circumference that is 1/2 of your height   Fasting blood sugar <107 (if you are diabetic, aim for an A1c <6.4%_   LDL cholesterol <130   Avoid smoking   Manage your stress   Get your preventive exams   Get your immunizations

## 2023-12-28 NOTE — LETTER
December 28, 2023     Maryann Sewell DO  [address] [facility] [address]    Patient: Randee Munoz   YOB: 1955   Date of Visit: 12/28/2023       Dear Dr. Maryann Sewell DO:    Thank you for referring Randee Munoz to me for evaluation. Below are my notes for this consultation.  If you have questions, please do not hesitate to call me. I look forward to following your patient along with you.       Sincerely,     Jesusita Gusman MD      CC: No Recipients  ______________________________________________________________________________________    Subjective  Patient ID: Randee Munoz is a 68 y.o. female who presents for uctd.  Re: UCTD  Pt overall is stable  No worsening rashes or fatigue.   Recovered well from DANICA but now having a lot of L knee pain with inactivity. Feels better after she takes a couple of steps.   Notes L knee is more swollen than R      OARRS:  Jesusita Gusman MD on 12/28/2023  3:16 PM  I have personally reviewed the OARRS report for Randee Munoz. I have considered the risks of abuse, dependence, addiction and diversion and I believe that it is clinically appropriate for Randee Munoz to be prescribed this medication    Is the patient prescribed a combination of a benzodiazepine and opioid?  No    Last Urine Drug Screen / ordered today: Yes  Recent Results (from the past 8760 hour(s))   OPIATE/OPIOID/BENZO PRESCRIPTION COMPLIANCE    Collection Time: 06/30/23  3:48 PM   Result Value Ref Range    DRUG SCREEN COMMENT URINE SEE BELOW     Creatine, Urine 105.7 mg/dL    Amphetamine Screen, Urine PRESUMPTIVE NEGATIVE NEGATIVE    Barbiturate Screen, Urine PRESUMPTIVE NEGATIVE NEGATIVE    Cannabinoid Screen, Urine PRESUMPTIVE NEGATIVE NEGATIVE    Cocaine Screen, Urine PRESUMPTIVE NEGATIVE NEGATIVE    PCP Screen, Urine PRESUMPTIVE NEGATIVE NEGATIVE    7-Aminoclonazepam <25 Cutoff <25 ng/mL    Alpha-Hydroxyalprazolam <25 Cutoff <25 ng/mL     Alpha-Hydroxymidazolam <25 Cutoff <25 ng/mL    Alprazolam <25 Cutoff <25 ng/mL    Chlordiazepoxide <25 Cutoff <25 ng/mL    Clonazepam <25 Cutoff <25 ng/mL    Diazepam <25 Cutoff <25 ng/mL    Lorazepam <25 Cutoff <25 ng/mL    Midazolam <25 Cutoff <25 ng/mL    Nordiazepam <25 Cutoff <25 ng/mL    Oxazepam <25 Cutoff <25 ng/mL    Temazepam <25 Cutoff <25 ng/mL    Zolpidem <25 Cutoff <25 ng/mL    Zolpidem Metabolite (ZCA) <25 Cutoff <25 ng/mL    6-Acetylmorphine <25 Cutoff <25 ng/mL    Codeine <50 Cutoff <50 ng/mL    Hydrocodone 192 (A) Cutoff <25 ng/mL    Hydromorphone 467 (A) Cutoff <25 ng/mL    Morphine Urine <50 Cutoff <50 ng/mL    Norhydrocodone 908 (A) Cutoff <25 ng/mL    Noroxycodone <25 Cutoff <25 ng/mL    Oxycodone <25 Cutoff <25 ng/mL    Oxymorphone <25 Cutoff <25 ng/mL    Tramadol <50 Cutoff <50 ng/mL    O-Desmethyltramadol <50 Cutoff <50 ng/mL    Fentanyl <2.5 Cutoff<2.5 ng/mL    Norfentanyl <2.5 Cutoff<2.5 ng/mL    METHADONE CONFIRMATION,URINE <25 Cutoff <25 ng/mL    EDDP <25 Cutoff <25 ng/mL     Results are as expected.         Controlled Substance Agreement:  Date of the Last Agreement: 10/3/23  Reviewed Controlled Substance Agreement including but not limited to the benefits, risks, and alternatives to treatment with a Controlled Substance medication(s).    Opioids:  What is the patient's goal of therapy? Pain relief  Is this being achieved with current treatment? y    I have calculated the patient's Morphine Dose Equivalent (MED):   I have considered referral to Pain Management and/or a specialist, and do not feel it is necessary at this time.    I feel that it is clinically indicated to continue this current medication regimen after consideration of alternative therapies, and other non-opioid treatment.    Opioid Risk Screening:  No increased risk    Pain Assessment:  Controlled substance questionnaire    On scale of 0-10  -pain on average over the last week? 6  -pain at its worst over the last week?  8  %pain relieved by meds? 100  Amount of pain relief with meds make a difference? y  MD agrees with efficacy of med? y    Better/same/worse  Physical functioning better  Family relationships better  Social relationships better   Mood better    Sleep pattern better  Overall functioning better  Side effects? no  Patient Active Problem List    Diagnosis Date Noted   • Medication management 10/03/2023   • Benign essential hypertension 08/23/2023   • Stage 3a chronic kidney disease (CMS/[facility]) 08/23/2023   • Undifferentiated connective tissue disease (CMS/[facility]) 08/23/2023   • Class 3 severe obesity due to excess calories with serious comorbidity and body mass index (BMI) of 40.0 to 44.9 in adult (CMS/[facility]) 08/23/2023   • Eczema 08/23/2023     Past Medical History:   Diagnosis Date   • COVID-19 12/29/2021    COVID-19 virus infection   • Osteoarthritis of hip 08/23/2023   • Rosacea    • Trochanteric bursitis, left hip 10/16/2017    Trochanteric bursitis of left hip   • Unspecified fracture of lower end of unspecified humerus, initial encounter for closed fracture     Elbow fracture     Current Outpatient Medications   Medication Instructions   • cholecalciferol (Vitamin D-3) 1,250 mcg (50,000 unit) capsule 1 capsule, oral, Weekly   • etodolac (LODINE) 400 mg, oral, 2 times daily PRN   • hydroCHLOROthiazide (HYDRODiuril) 25 mg tablet 1 tablet, oral, Daily   • [START ON 1/3/2024] HYDROcodone-acetaminophen (Norco) 7.5-325 mg tablet 1 tablet, oral, Every 6 hours PRN, pain   • triamcinolone (Kenalog) 0.1 % cream 1 Application, Topical, 2 or 3 times daily     Allergies   Allergen Reactions   • Sulfa (Sulfonamide Antibiotics) Hives   • Aspirin Other     sensitive   • Opioids - Morphine Analogues Nausea/vomiting   • Clindamycin Hives and Rash         Review of Systems   Constitutional:  Negative for fatigue and fever.   Respiratory:  Negative for cough and shortness of breath.    Cardiovascular:  Negative for leg swelling.  "  Musculoskeletal:  Positive for arthralgias, gait problem and joint swelling. Negative for back pain and myalgias.   Skin:  Negative for color change and rash.   Neurological:  Negative for weakness and numbness.       Objective /84   Pulse 62   Temp 36.2 °C (97.2 °F)   Ht 1.499 m (4' 11\")   Wt 92.1 kg (203 lb)   BMI 41.00 kg/m²     Physical Exam  Vitals reviewed.   Constitutional:       General: She is not in acute distress.     Appearance: Normal appearance.   HENT:      Head: Normocephalic and atraumatic.   Eyes:      Conjunctiva/sclera: Conjunctivae normal.   Pulmonary:      Effort: Pulmonary effort is normal. No respiratory distress.   Musculoskeletal:         General: No swelling, tenderness or deformity.      Cervical back: Normal range of motion.      Right lower leg: No edema.      Left lower leg: No edema.      Comments: Walks with limp  No synovitis of upper extremity joints.  L knee with soft tissue swelling near suprapatellar area.  +medial joint line tenderness   Skin:     Coloration: Skin is not pale.      Findings: No erythema or rash.   Neurological:      General: No focal deficit present.      Mental Status: She is alert and oriented to person, place, and time. Mental status is at baseline.      Gait: Gait abnormal.   Psychiatric:         Mood and Affect: Mood normal.         Assessment/Plan  Problem List Items Addressed This Visit             ICD-10-CM    RESOLVED: Osteoarthritis of hip M16.9    Stage 3a chronic kidney disease (CMS/HCC) N18.31     Chronic Condition Documentation: Stable based on symptoms and exam. Continue      established treatment plan and follow-up at least yearly.           Undifferentiated connective tissue disease (CMS/[facility]) - Primary M35.9     UCTD on NSAID therapy.  Doesn't meet criteria for lupus.   Has L knee pain and most likely has OA of knee.   Imaging studies ordered.  Labs ordered today to monitor for increased disease activity, end organ manifestations " and to monitor for any drug toxicities due to chronic medications           Relevant Medications    HYDROcodone-acetaminophen (Norco) 7.5-325 mg tablet (Start on 1/3/2024)    Other Relevant Orders    ARIC with Reflex to MARY    XR knee left 3 views    Class 3 severe obesity due to excess calories with serious comorbidity and body mass index (BMI) of 40.0 to 44.9 in adult (CMS/[facility]) E66.01, Z68.41    Medication management Z79.899     On chronic opioid.  Will continue to monitor usage  CSA done 10/3/23          Follow up 3 mo       Jesusita Gusman MD 12/28/23 3:34 PM

## 2023-12-29 LAB
ALBUMIN SERPL BCP-MCNC: 4.5 G/DL (ref 3.4–5)
ALP SERPL-CCNC: 109 U/L (ref 33–136)
ALT SERPL W P-5'-P-CCNC: 15 U/L (ref 7–45)
ANA PATTERN: ABNORMAL
ANA SER QL HEP2 SUBST: POSITIVE
ANA TITR SER IF: ABNORMAL {TITER}
ANION GAP SERPL CALC-SCNC: 15 MMOL/L (ref 10–20)
AST SERPL W P-5'-P-CCNC: 16 U/L (ref 9–39)
BASOPHILS # BLD AUTO: 0.05 X10*3/UL (ref 0–0.1)
BASOPHILS NFR BLD AUTO: 0.5 %
BILIRUB SERPL-MCNC: 0.5 MG/DL (ref 0–1.2)
BUN SERPL-MCNC: 33 MG/DL (ref 6–23)
CALCIUM SERPL-MCNC: 9.8 MG/DL (ref 8.6–10.6)
CENTROMERE B AB SER-ACNC: <0.2 AI
CHLORIDE SERPL-SCNC: 101 MMOL/L (ref 98–107)
CHROMATIN AB SERPL-ACNC: <0.2 AI
CO2 SERPL-SCNC: 29 MMOL/L (ref 21–32)
CREAT SERPL-MCNC: 1.21 MG/DL (ref 0.5–1.05)
CRP SERPL-MCNC: 1.15 MG/DL
DSDNA AB SER-ACNC: 21 IU/ML
ENA JO1 AB SER QL IA: <0.2 AI
ENA RNP AB SER IA-ACNC: 0.3 AI
ENA SCL70 AB SER QL IA: <0.2 AI
ENA SM AB SER IA-ACNC: <0.2 AI
ENA SM+RNP AB SER QL IA: <0.2 AI
ENA SS-A AB SER IA-ACNC: <0.2 AI
ENA SS-B AB SER IA-ACNC: <0.2 AI
EOSINOPHIL # BLD AUTO: 0.13 X10*3/UL (ref 0–0.7)
EOSINOPHIL NFR BLD AUTO: 1.3 %
ERYTHROCYTE [DISTWIDTH] IN BLOOD BY AUTOMATED COUNT: 13.1 % (ref 11.5–14.5)
ERYTHROCYTE [SEDIMENTATION RATE] IN BLOOD BY WESTERGREN METHOD: 47 MM/H (ref 0–30)
GFR SERPL CREATININE-BSD FRML MDRD: 48 ML/MIN/1.73M*2
GLUCOSE SERPL-MCNC: 95 MG/DL (ref 74–99)
HCT VFR BLD AUTO: 37.9 % (ref 36–46)
HGB BLD-MCNC: 11.6 G/DL (ref 12–16)
IMM GRANULOCYTES # BLD AUTO: 0.05 X10*3/UL (ref 0–0.7)
IMM GRANULOCYTES NFR BLD AUTO: 0.5 % (ref 0–0.9)
IRON SATN MFR SERPL: 15 % (ref 25–45)
IRON SERPL-MCNC: 48 UG/DL (ref 35–150)
LYMPHOCYTES # BLD AUTO: 1.51 X10*3/UL (ref 1.2–4.8)
LYMPHOCYTES NFR BLD AUTO: 14.9 %
MCH RBC QN AUTO: 29.9 PG (ref 26–34)
MCHC RBC AUTO-ENTMCNC: 30.6 G/DL (ref 32–36)
MCV RBC AUTO: 98 FL (ref 80–100)
MONOCYTES # BLD AUTO: 0.75 X10*3/UL (ref 0.1–1)
MONOCYTES NFR BLD AUTO: 7.4 %
NEUTROPHILS # BLD AUTO: 7.67 X10*3/UL (ref 1.2–7.7)
NEUTROPHILS NFR BLD AUTO: 75.4 %
NRBC BLD-RTO: 0 /100 WBCS (ref 0–0)
PLATELET # BLD AUTO: 247 X10*3/UL (ref 150–450)
POTASSIUM SERPL-SCNC: 4 MMOL/L (ref 3.5–5.3)
PROT SERPL-MCNC: 7.4 G/DL (ref 6.4–8.2)
RBC # BLD AUTO: 3.88 X10*6/UL (ref 4–5.2)
RIBOSOMAL P AB SER-ACNC: <0.2 AI
SODIUM SERPL-SCNC: 141 MMOL/L (ref 136–145)
TIBC SERPL-MCNC: 316 UG/DL (ref 240–445)
UIBC SERPL-MCNC: 268 UG/DL (ref 110–370)
WBC # BLD AUTO: 10.2 X10*3/UL (ref 4.4–11.3)

## 2024-01-02 ENCOUNTER — PREP FOR PROCEDURE (OUTPATIENT)
Dept: RHEUMATOLOGY | Facility: CLINIC | Age: 69
End: 2024-01-02
Payer: MEDICARE

## 2024-01-04 ENCOUNTER — TELEPHONE (OUTPATIENT)
Dept: RHEUMATOLOGY | Facility: CLINIC | Age: 69
End: 2024-01-04
Payer: MEDICARE

## 2024-01-04 DIAGNOSIS — I10 BENIGN ESSENTIAL HYPERTENSION: Primary | ICD-10-CM

## 2024-01-04 DIAGNOSIS — M35.9 UNDIFFERENTIATED CONNECTIVE TISSUE DISEASE (MULTI): ICD-10-CM

## 2024-01-04 RX ORDER — HYDROCHLOROTHIAZIDE 25 MG/1
25 TABLET ORAL DAILY
Qty: 90 TABLET | Refills: 3 | Status: SHIPPED | OUTPATIENT
Start: 2024-01-04 | End: 2025-01-03

## 2024-01-04 NOTE — TELEPHONE ENCOUNTER
Patient called in asking for a refill on hydrochlorothiazide 25mg take one tablet daily #90 sent to [facility] in [place] on [address] Phone: [phone]. Thanks

## 2024-02-07 ENCOUNTER — TELEPHONE (OUTPATIENT)
Dept: PRIMARY CARE | Facility: CLINIC | Age: 69
End: 2024-02-07
Payer: MEDICARE

## 2024-02-07 DIAGNOSIS — M35.9 UNDIFFERENTIATED CONNECTIVE TISSUE DISEASE (MULTI): ICD-10-CM

## 2024-02-07 RX ORDER — HYDROCODONE BITARTRATE AND ACETAMINOPHEN 7.5; 325 MG/1; MG/1
1 TABLET ORAL EVERY 6 HOURS PRN
Qty: 120 TABLET | Refills: 0 | Status: SHIPPED | OUTPATIENT
Start: 2024-02-07 | End: 2024-03-11 | Stop reason: SDUPTHER

## 2024-03-11 ENCOUNTER — TELEPHONE (OUTPATIENT)
Dept: PRIMARY CARE | Facility: CLINIC | Age: 69
End: 2024-03-11
Payer: MEDICARE

## 2024-03-11 DIAGNOSIS — M35.9 UNDIFFERENTIATED CONNECTIVE TISSUE DISEASE (MULTI): ICD-10-CM

## 2024-03-11 RX ORDER — HYDROCODONE BITARTRATE AND ACETAMINOPHEN 7.5; 325 MG/1; MG/1
1 TABLET ORAL EVERY 6 HOURS PRN
Qty: 120 TABLET | Refills: 0 | Status: SHIPPED | OUTPATIENT
Start: 2024-03-11 | End: 2024-04-03 | Stop reason: SDUPTHER

## 2024-03-11 NOTE — TELEPHONE ENCOUNTER
Pt called for a refill on her hydrocodone-acetaminophen 7.5-325mg       [id number] - [place], [place] - [address]  [address]  [facility] [address]  Phone: [phone] Fax: [phone]

## 2024-04-03 ENCOUNTER — LAB (OUTPATIENT)
Dept: LAB | Facility: LAB | Age: 69
End: 2024-04-03
Payer: MEDICARE

## 2024-04-03 ENCOUNTER — TELEPHONE (OUTPATIENT)
Dept: PRIMARY CARE | Facility: CLINIC | Age: 69
End: 2024-04-03

## 2024-04-03 ENCOUNTER — OFFICE VISIT (OUTPATIENT)
Dept: RHEUMATOLOGY | Facility: CLINIC | Age: 69
End: 2024-04-03
Payer: MEDICARE

## 2024-04-03 VITALS
SYSTOLIC BLOOD PRESSURE: 170 MMHG | DIASTOLIC BLOOD PRESSURE: 89 MMHG | WEIGHT: 204.8 LBS | BODY MASS INDEX: 41.29 KG/M2 | OXYGEN SATURATION: 96 % | HEART RATE: 63 BPM | TEMPERATURE: 96.8 F | HEIGHT: 59 IN

## 2024-04-03 DIAGNOSIS — Z79.899 MEDICATION MANAGEMENT: ICD-10-CM

## 2024-04-03 DIAGNOSIS — I10 BENIGN ESSENTIAL HYPERTENSION: ICD-10-CM

## 2024-04-03 DIAGNOSIS — M17.12 PRIMARY OSTEOARTHRITIS OF LEFT KNEE: ICD-10-CM

## 2024-04-03 DIAGNOSIS — E66.01 CLASS 3 SEVERE OBESITY DUE TO EXCESS CALORIES WITH SERIOUS COMORBIDITY AND BODY MASS INDEX (BMI) OF 40.0 TO 44.9 IN ADULT (MULTI): ICD-10-CM

## 2024-04-03 DIAGNOSIS — N18.31 STAGE 3A CHRONIC KIDNEY DISEASE (MULTI): ICD-10-CM

## 2024-04-03 DIAGNOSIS — M35.9 UNDIFFERENTIATED CONNECTIVE TISSUE DISEASE (MULTI): Primary | ICD-10-CM

## 2024-04-03 DIAGNOSIS — M35.9 UNDIFFERENTIATED CONNECTIVE TISSUE DISEASE (MULTI): ICD-10-CM

## 2024-04-03 DIAGNOSIS — L30.8 OTHER ECZEMA: ICD-10-CM

## 2024-04-03 LAB
ALBUMIN SERPL BCP-MCNC: 4.1 G/DL (ref 3.4–5)
ALP SERPL-CCNC: 101 U/L (ref 33–136)
ALT SERPL W P-5'-P-CCNC: 15 U/L (ref 7–45)
AMPHETAMINES UR QL SCN: NORMAL
ANION GAP SERPL CALC-SCNC: 14 MMOL/L (ref 10–20)
APPEARANCE UR: CLEAR
AST SERPL W P-5'-P-CCNC: 14 U/L (ref 9–39)
BARBITURATES UR QL SCN: NORMAL
BASOPHILS # BLD AUTO: 0.05 X10*3/UL (ref 0–0.1)
BASOPHILS NFR BLD AUTO: 0.6 %
BILIRUB SERPL-MCNC: 0.6 MG/DL (ref 0–1.2)
BILIRUB UR STRIP.AUTO-MCNC: ABNORMAL MG/DL
BUN SERPL-MCNC: 29 MG/DL (ref 6–23)
BZE UR QL SCN: NORMAL
CALCIUM SERPL-MCNC: 9.3 MG/DL (ref 8.6–10.6)
CANNABINOIDS UR QL SCN: NORMAL
CHLORIDE SERPL-SCNC: 102 MMOL/L (ref 98–107)
CO2 SERPL-SCNC: 27 MMOL/L (ref 21–32)
COLOR UR: YELLOW
CREAT SERPL-MCNC: 0.98 MG/DL (ref 0.5–1.05)
CREAT UR-MCNC: 139.3 MG/DL (ref 20–320)
CRP SERPL-MCNC: 1.1 MG/DL
EGFRCR SERPLBLD CKD-EPI 2021: 62 ML/MIN/1.73M*2
EOSINOPHIL # BLD AUTO: 0.14 X10*3/UL (ref 0–0.7)
EOSINOPHIL NFR BLD AUTO: 1.8 %
ERYTHROCYTE [DISTWIDTH] IN BLOOD BY AUTOMATED COUNT: 13.2 % (ref 11.5–14.5)
ERYTHROCYTE [SEDIMENTATION RATE] IN BLOOD BY WESTERGREN METHOD: 48 MM/H (ref 0–30)
GLUCOSE SERPL-MCNC: 95 MG/DL (ref 74–99)
GLUCOSE UR STRIP.AUTO-MCNC: NORMAL MG/DL
HCT VFR BLD AUTO: 36 % (ref 36–46)
HGB BLD-MCNC: 11.4 G/DL (ref 12–16)
IMM GRANULOCYTES # BLD AUTO: 0.04 X10*3/UL (ref 0–0.7)
IMM GRANULOCYTES NFR BLD AUTO: 0.5 % (ref 0–0.9)
KETONES UR STRIP.AUTO-MCNC: NEGATIVE MG/DL
LEUKOCYTE ESTERASE UR QL STRIP.AUTO: NEGATIVE
LYMPHOCYTES # BLD AUTO: 1.29 X10*3/UL (ref 1.2–4.8)
LYMPHOCYTES NFR BLD AUTO: 16.7 %
MCH RBC QN AUTO: 29.8 PG (ref 26–34)
MCHC RBC AUTO-ENTMCNC: 31.7 G/DL (ref 32–36)
MCV RBC AUTO: 94 FL (ref 80–100)
MONOCYTES # BLD AUTO: 0.7 X10*3/UL (ref 0.1–1)
MONOCYTES NFR BLD AUTO: 9.1 %
NEUTROPHILS # BLD AUTO: 5.49 X10*3/UL (ref 1.2–7.7)
NEUTROPHILS NFR BLD AUTO: 71.3 %
NITRITE UR QL STRIP.AUTO: NEGATIVE
NRBC BLD-RTO: 0 /100 WBCS (ref 0–0)
PCP UR QL SCN: NORMAL
PH UR STRIP.AUTO: 6.5 [PH]
PLATELET # BLD AUTO: 218 X10*3/UL (ref 150–450)
POTASSIUM SERPL-SCNC: 5 MMOL/L (ref 3.5–5.3)
PROT SERPL-MCNC: 6.6 G/DL (ref 6.4–8.2)
PROT UR STRIP.AUTO-MCNC: NEGATIVE MG/DL
RBC # BLD AUTO: 3.82 X10*6/UL (ref 4–5.2)
RBC # UR STRIP.AUTO: NEGATIVE /UL
SODIUM SERPL-SCNC: 138 MMOL/L (ref 136–145)
SP GR UR STRIP.AUTO: 1.02
UROBILINOGEN UR STRIP.AUTO-MCNC: NORMAL MG/DL
WBC # BLD AUTO: 7.7 X10*3/UL (ref 4.4–11.3)

## 2024-04-03 PROCEDURE — 99214 OFFICE O/P EST MOD 30 MIN: CPT | Performed by: INTERNAL MEDICINE

## 2024-04-03 PROCEDURE — 86038 ANTINUCLEAR ANTIBODIES: CPT

## 2024-04-03 PROCEDURE — 86235 NUCLEAR ANTIGEN ANTIBODY: CPT

## 2024-04-03 PROCEDURE — 80361 OPIATES 1 OR MORE: CPT

## 2024-04-03 PROCEDURE — 1036F TOBACCO NON-USER: CPT | Performed by: INTERNAL MEDICINE

## 2024-04-03 PROCEDURE — 80365 DRUG SCREENING OXYCODONE: CPT

## 2024-04-03 PROCEDURE — 1158F ADVNC CARE PLAN TLK DOCD: CPT | Performed by: INTERNAL MEDICINE

## 2024-04-03 PROCEDURE — 1159F MED LIST DOCD IN RCRD: CPT | Performed by: INTERNAL MEDICINE

## 2024-04-03 PROCEDURE — 85025 COMPLETE CBC W/AUTO DIFF WBC: CPT

## 2024-04-03 PROCEDURE — 36415 COLL VENOUS BLD VENIPUNCTURE: CPT

## 2024-04-03 PROCEDURE — 82570 ASSAY OF URINE CREATININE: CPT

## 2024-04-03 PROCEDURE — 86140 C-REACTIVE PROTEIN: CPT

## 2024-04-03 PROCEDURE — 3008F BODY MASS INDEX DOCD: CPT | Performed by: INTERNAL MEDICINE

## 2024-04-03 PROCEDURE — 80346 BENZODIAZEPINES1-12: CPT

## 2024-04-03 PROCEDURE — 80358 DRUG SCREENING METHADONE: CPT

## 2024-04-03 PROCEDURE — 80307 DRUG TEST PRSMV CHEM ANLYZR: CPT

## 2024-04-03 PROCEDURE — 3077F SYST BP >= 140 MM HG: CPT | Performed by: INTERNAL MEDICINE

## 2024-04-03 PROCEDURE — 80354 DRUG SCREENING FENTANYL: CPT

## 2024-04-03 PROCEDURE — 1160F RVW MEDS BY RX/DR IN RCRD: CPT | Performed by: INTERNAL MEDICINE

## 2024-04-03 PROCEDURE — 1123F ACP DISCUSS/DSCN MKR DOCD: CPT | Performed by: INTERNAL MEDICINE

## 2024-04-03 PROCEDURE — 86225 DNA ANTIBODY NATIVE: CPT

## 2024-04-03 PROCEDURE — 81003 URINALYSIS AUTO W/O SCOPE: CPT

## 2024-04-03 PROCEDURE — 85652 RBC SED RATE AUTOMATED: CPT

## 2024-04-03 PROCEDURE — 3079F DIAST BP 80-89 MM HG: CPT | Performed by: INTERNAL MEDICINE

## 2024-04-03 PROCEDURE — 80373 DRUG SCREENING TRAMADOL: CPT

## 2024-04-03 PROCEDURE — 80053 COMPREHEN METABOLIC PANEL: CPT

## 2024-04-03 PROCEDURE — 80368 SEDATIVE HYPNOTICS: CPT

## 2024-04-03 RX ORDER — HYDROCODONE BITARTRATE AND ACETAMINOPHEN 7.5; 325 MG/1; MG/1
1 TABLET ORAL EVERY 6 HOURS PRN
Qty: 120 TABLET | Refills: 0 | Status: SHIPPED | OUTPATIENT
Start: 2024-04-10 | End: 2024-05-13 | Stop reason: SDUPTHER

## 2024-04-03 RX ORDER — TRIAMCINOLONE ACETONIDE 1 MG/G
1 CREAM TOPICAL 2 TIMES DAILY
Qty: 45 G | Refills: 1 | Status: SHIPPED | OUTPATIENT
Start: 2024-04-03 | End: 2025-04-03

## 2024-04-03 ASSESSMENT — ENCOUNTER SYMPTOMS
MYALGIAS: 0
SHORTNESS OF BREATH: 0
FATIGUE: 0
COUGH: 0
NUMBNESS: 0
ARTHRALGIAS: 1
WEAKNESS: 0
FEVER: 0
BACK PAIN: 0
COLOR CHANGE: 0
JOINT SWELLING: 0

## 2024-04-03 ASSESSMENT — PATIENT HEALTH QUESTIONNAIRE - PHQ9
SUM OF ALL RESPONSES TO PHQ9 QUESTIONS 1 AND 2: 0
1. LITTLE INTEREST OR PLEASURE IN DOING THINGS: NOT AT ALL
2. FEELING DOWN, DEPRESSED OR HOPELESS: NOT AT ALL

## 2024-04-03 NOTE — PATIENT INSTRUCTIONS
It was a pleasure to see you today  Please call if your symptoms worsen  Please review your summary for education and reminders.  Follow up at your next appointment.    If you had labs/xrays done today, you will be able to view on [employer].   We will contact you when the results are reviewed for further discussion.  Please note that you may receive your results before I have had a chance to review.  Please know I will be contacting you for discussion  Homegoing instructions for all patient  A healthy lifestyle helps chronic diseases  These are all the goals you should strive to improve your overall health   Blood pressure <130/85   BMI of <30 or waist circumference that is 1/2 of your height   Fasting blood sugar <107 (if you are diabetic, aim for an A1c <6.4%_   LDL cholesterol <130   Avoid smoking   Manage your stress   Get your preventive exams   Get your immunizations   Current weight: 92.9 kg (204 lb 12.8 oz)  Weight change since last visit (-) denotes wt loss 1.8 lbs   Weight loss needed to achieve BMI 25: 81.3 Lbs  Weight loss needed to achieve BMI 30: 56.6 Lbs

## 2024-04-03 NOTE — PROGRESS NOTES
RHEUMATOLOGY PROGRESS NOTE  Randee Munoz 68 y.o. female  Chief Complaint   Patient presents with    uctd       SUBJECTIVE  Re:: UCTD  Pt reports symptoms are stable.  No worsening joint aches, fatigue or rashes.  Meds help control most symptoms.   Eczema is worse in winter but cream helps.   Pt with knee pain and has a lot of gelling when she sits too long.   PT was offered in the past but pt elected to try to be more active instead        OARRS:  Jesusita Gusman MD on 4/3/2024  8:47 AM  I have personally reviewed the OARRS report for Randee Munoz. I have considered the risks of abuse, dependence, addiction and diversion and I believe that it is clinically appropriate for Randee Munoz to be prescribed this medication    Is the patient prescribed a combination of a benzodiazepine and opioid?  No    Last Urine Drug Screen / ordered today: Yes  Recent Results (from the past 8760 hour(s))   OPIATE/OPIOID/BENZO PRESCRIPTION COMPLIANCE    Collection Time: 06/30/23  3:48 PM   Result Value Ref Range    DRUG SCREEN COMMENT URINE SEE BELOW     Creatine, Urine 105.7 mg/dL    Amphetamine Screen, Urine PRESUMPTIVE NEGATIVE NEGATIVE    Barbiturate Screen, Urine PRESUMPTIVE NEGATIVE NEGATIVE    Cannabinoid Screen, Urine PRESUMPTIVE NEGATIVE NEGATIVE    Cocaine Screen, Urine PRESUMPTIVE NEGATIVE NEGATIVE    PCP Screen, Urine PRESUMPTIVE NEGATIVE NEGATIVE    7-Aminoclonazepam <25 Cutoff <25 ng/mL    Alpha-Hydroxyalprazolam <25 Cutoff <25 ng/mL    Alpha-Hydroxymidazolam <25 Cutoff <25 ng/mL    Alprazolam <25 Cutoff <25 ng/mL    Chlordiazepoxide <25 Cutoff <25 ng/mL    Clonazepam <25 Cutoff <25 ng/mL    Diazepam <25 Cutoff <25 ng/mL    Lorazepam <25 Cutoff <25 ng/mL    Midazolam <25 Cutoff <25 ng/mL    Nordiazepam <25 Cutoff <25 ng/mL    Oxazepam <25 Cutoff <25 ng/mL    Temazepam <25 Cutoff <25 ng/mL    Zolpidem <25 Cutoff <25 ng/mL    Zolpidem Metabolite (ZCA) <25 Cutoff <25 ng/mL    6-Acetylmorphine <25 Cutoff  <25 ng/mL    Codeine <50 Cutoff <50 ng/mL    Hydrocodone 192 (A) Cutoff <25 ng/mL    Hydromorphone 467 (A) Cutoff <25 ng/mL    Morphine Urine <50 Cutoff <50 ng/mL    Norhydrocodone 908 (A) Cutoff <25 ng/mL    Noroxycodone <25 Cutoff <25 ng/mL    Oxycodone <25 Cutoff <25 ng/mL    Oxymorphone <25 Cutoff <25 ng/mL    Tramadol <50 Cutoff <50 ng/mL    O-Desmethyltramadol <50 Cutoff <50 ng/mL    Fentanyl <2.5 Cutoff<2.5 ng/mL    Norfentanyl <2.5 Cutoff<2.5 ng/mL    METHADONE CONFIRMATION,URINE <25 Cutoff <25 ng/mL    EDDP <25 Cutoff <25 ng/mL     Results are as expected.         Controlled Substance Agreement:  Date of the Last Agreement: 10/23  Reviewed Controlled Substance Agreement including but not limited to the benefits, risks, and alternatives to treatment with a Controlled Substance medication(s).    Opioids:  What is the patient's goal of therapy? Pain relief  Is this being achieved with current treatment? yes    I have calculated the patient's Morphine Dose Equivalent (MED):   I have considered referral to Pain Management and/or a specialist, and do not feel it is necessary at this time.    I feel that it is clinically indicated to continue this current medication regimen after consideration of alternative therapies, and other non-opioid treatment.    Opioid Risk Screening:  No change in risk    Pain Assessment:  Controlled substance questionnaire    On scale of 0-10  -pain on average over the last week? 6  -pain at its worst over the last week? 9  %pain relieved by meds? 50  Amount of pain relief with meds make a difference? y  MD agrees with efficacy of med? y    Better/same/worse  Physical functioning better  Family relationships better   Social relationships better  Mood better    Sleep pattern better  Overall functioning better  Side effects? no    Patient Active Problem List    Diagnosis Date Noted    Primary osteoarthritis of left knee 04/03/2024    Medication management 10/03/2023    Benign essential  "hypertension 08/23/2023    Stage 3a chronic kidney disease (CMS/[facility]) 08/23/2023    Undifferentiated connective tissue disease (CMS/[facility]) 08/23/2023    Class 3 severe obesity due to excess calories with serious comorbidity and body mass index (BMI) of 40.0 to 44.9 in adult (CMS/[facility]) 08/23/2023    Eczema 08/23/2023     Past Medical History:   Diagnosis Date    COVID-19 12/29/2021    COVID-19 virus infection    Osteoarthritis of hip 08/23/2023    Rosacea     Trochanteric bursitis, left hip 10/16/2017    Trochanteric bursitis of left hip    Unspecified fracture of lower end of unspecified humerus, initial encounter for closed fracture     Elbow fracture     Current Outpatient Medications   Medication Instructions    cholecalciferol (Vitamin D-3) 1,250 mcg (50,000 unit) capsule 1 capsule, oral, Weekly    etodolac (LODINE) 400 mg, oral, 2 times daily PRN    hydroCHLOROthiazide (HYDRODIURIL) 25 mg, oral, Daily    [START ON 4/10/2024] HYDROcodone-acetaminophen (Norco) 7.5-325 mg tablet 1 tablet, oral, Every 6 hours PRN, pain    triamcinolone (Kenalog) 0.1 % cream 1 Application, Topical, 2 times daily, 2 or 3 times daily     Allergies   Allergen Reactions    Sulfa (Sulfonamide Antibiotics) Hives    Aspirin Other     sensitive    Opioids - Morphine Analogues Nausea/vomiting    Clindamycin Hives and Rash     Review of Systems   Constitutional:  Negative for fatigue and fever.   Respiratory:  Negative for cough and shortness of breath.    Cardiovascular:  Negative for leg swelling.   Musculoskeletal:  Positive for arthralgias and gait problem. Negative for back pain, joint swelling and myalgias.   Skin:  Positive for rash. Negative for color change.   Neurological:  Negative for weakness and numbness.       PHYSICAL EXAM  /89   Pulse 63   Temp 36 °C (96.8 °F)   Ht 1.499 m (4' 11\")   Wt 92.9 kg (204 lb 12.8 oz)   SpO2 96%   BMI 41.36 kg/m²   Physical Exam  Vitals reviewed.   Constitutional:       General: She is not " in acute distress.     Appearance: Normal appearance.   HENT:      Head: Normocephalic and atraumatic.   Eyes:      General: No scleral icterus.     Extraocular Movements: Extraocular movements intact.      Conjunctiva/sclera: Conjunctivae normal.      Pupils: Pupils are equal, round, and reactive to light.   Cardiovascular:      Pulses: Normal pulses.   Pulmonary:      Effort: Pulmonary effort is normal. No respiratory distress.   Musculoskeletal:         General: Tenderness (medial joint line tenderness) present. No swelling or deformity.      Cervical back: Normal range of motion.      Right lower leg: No edema.      Left lower leg: No edema.      Comments: No synovitis of examined joints   Skin:     General: Skin is dry.      Coloration: Skin is not pale.      Findings: Rash (eczema on arms and leg) present. No erythema.   Neurological:      General: No focal deficit present.      Mental Status: She is alert and oriented to person, place, and time. Mental status is at baseline.      Gait: Gait abnormal (limp).   Psychiatric:         Mood and Affect: Mood normal.         Assessment/plan  Problem List Items Addressed This Visit       Benign essential hypertension    Current Assessment & Plan     Elevated today.  PCP should monitor         Stage 3a chronic kidney disease (CMS/HCC)    Current Assessment & Plan     Labs ordered today  to monitor         Undifferentiated connective tissue disease (CMS/HCC) - Primary    Current Assessment & Plan     On NSAID therapy.  No signs of disease progression  Labs ordered today to monitor for increased disease activity, end organ manifestations and to monitor for any drug toxicities due to chronic medications           Relevant Medications    HYDROcodone-acetaminophen (Norco) 7.5-325 mg tablet (Start on 4/10/2024)    Other Relevant Orders    CBC and Auto Differential    Comprehensive Metabolic Panel    C-Reactive Protein    Sedimentation Rate    ARIC with Reflex to MARY     Urinalysis with Reflex Microscopic    Class 3 severe obesity due to excess calories with serious comorbidity and body mass index (BMI) of 40.0 to 44.9 in adult (CMS/[facility])    Current Assessment & Plan     Noted.  Discussed activity levels         Eczema    Relevant Medications    triamcinolone (Kenalog) 0.1 % cream    Medication management    Overview     CSA 10/23  UDS 6/23         Current Assessment & Plan     Review of CSA done and  to continue to adhere to policy  CSA updated in EMR:             Relevant Orders    Opiate/Opioid/Benzo Prescription Compliance    Primary osteoarthritis of left knee    Current Assessment & Plan     Pt with waxing and waning pain.  Advised on quad strengthening   Pt to start walking more          Follow up: ___3__months

## 2024-04-03 NOTE — TELEPHONE ENCOUNTER
[facility] left a message in regards to the Triamcinolone. They need to know how large the area is so they can bill the insurance correctly.       Pharmacy PH: [phone]

## 2024-04-03 NOTE — ASSESSMENT & PLAN NOTE
On NSAID therapy.  No signs of disease progression  Labs ordered today to monitor for increased disease activity, end organ manifestations and to monitor for any drug toxicities due to chronic medications

## 2024-04-04 LAB
ANA PATTERN: ABNORMAL
ANA SER QL HEP2 SUBST: POSITIVE
ANA TITR SER IF: ABNORMAL {TITER}
CENTROMERE B AB SER-ACNC: <0.2 AI
CHROMATIN AB SERPL-ACNC: <0.2 AI
DSDNA AB SER-ACNC: 31 IU/ML
ENA JO1 AB SER QL IA: <0.2 AI
ENA RNP AB SER IA-ACNC: 0.2 AI
ENA SCL70 AB SER QL IA: <0.2 AI
ENA SM AB SER IA-ACNC: <0.2 AI
ENA SM+RNP AB SER QL IA: <0.2 AI
ENA SS-A AB SER IA-ACNC: <0.2 AI
ENA SS-B AB SER IA-ACNC: <0.2 AI
RIBOSOMAL P AB SER-ACNC: <0.2 AI

## 2024-04-08 LAB
1OH-MIDAZOLAM UR CFM-MCNC: <25 NG/ML
6MAM UR CFM-MCNC: <25 NG/ML
7AMINOCLONAZEPAM UR CFM-MCNC: <25 NG/ML
A-OH ALPRAZ UR CFM-MCNC: <25 NG/ML
ALPRAZ UR CFM-MCNC: <25 NG/ML
CHLORDIAZEP UR CFM-MCNC: <25 NG/ML
CLONAZEPAM UR CFM-MCNC: <25 NG/ML
CODEINE UR CFM-MCNC: <50 NG/ML
DIAZEPAM UR CFM-MCNC: <25 NG/ML
EDDP UR CFM-MCNC: <25 NG/ML
FENTANYL UR CFM-MCNC: <2.5 NG/ML
HYDROCODONE CTO UR CFM-MCNC: 1370 NG/ML
HYDROMORPHONE UR CFM-MCNC: 854 NG/ML
LORAZEPAM UR CFM-MCNC: <25 NG/ML
METHADONE UR CFM-MCNC: <25 NG/ML
MIDAZOLAM UR CFM-MCNC: <25 NG/ML
MORPHINE UR CFM-MCNC: <50 NG/ML
NORDIAZEPAM UR CFM-MCNC: <25 NG/ML
NORFENTANYL UR CFM-MCNC: <2.5 NG/ML
NORHYDROCODONE UR CFM-MCNC: >1000 NG/ML
NOROXYCODONE UR CFM-MCNC: <25 NG/ML
NORTRAMADOL UR-MCNC: <50 NG/ML
OXAZEPAM UR CFM-MCNC: <25 NG/ML
OXYCODONE UR CFM-MCNC: <25 NG/ML
OXYMORPHONE UR CFM-MCNC: <25 NG/ML
TEMAZEPAM UR CFM-MCNC: <25 NG/ML
TRAMADOL UR CFM-MCNC: <50 NG/ML
ZOLPIDEM UR CFM-MCNC: <25 NG/ML
ZOLPIDEM UR-MCNC: <25 NG/ML

## 2024-05-13 ENCOUNTER — TELEPHONE (OUTPATIENT)
Dept: PRIMARY CARE | Facility: CLINIC | Age: 69
End: 2024-05-13
Payer: MEDICARE

## 2024-05-13 DIAGNOSIS — M35.9 UNDIFFERENTIATED CONNECTIVE TISSUE DISEASE (MULTI): ICD-10-CM

## 2024-05-13 RX ORDER — HYDROCODONE BITARTRATE AND ACETAMINOPHEN 7.5; 325 MG/1; MG/1
1 TABLET ORAL EVERY 6 HOURS PRN
Qty: 120 TABLET | Refills: 0 | Status: SHIPPED | OUTPATIENT
Start: 2024-05-13 | End: 2025-05-13

## 2024-06-14 ENCOUNTER — TELEPHONE (OUTPATIENT)
Dept: RHEUMATOLOGY | Facility: CLINIC | Age: 69
End: 2024-06-14
Payer: MEDICARE

## 2024-06-14 DIAGNOSIS — M35.9 UNDIFFERENTIATED CONNECTIVE TISSUE DISEASE (MULTI): ICD-10-CM

## 2024-06-14 RX ORDER — HYDROCODONE BITARTRATE AND ACETAMINOPHEN 7.5; 325 MG/1; MG/1
1 TABLET ORAL EVERY 6 HOURS PRN
Qty: 120 TABLET | Refills: 0 | Status: SHIPPED | OUTPATIENT
Start: 2024-06-14 | End: 2025-06-14

## 2024-06-22 PROBLEM — Z79.1 NSAID LONG-TERM USE: Status: ACTIVE | Noted: 2024-06-22

## 2024-06-24 ENCOUNTER — APPOINTMENT (OUTPATIENT)
Dept: RHEUMATOLOGY | Facility: CLINIC | Age: 69
End: 2024-06-24
Payer: MEDICARE

## 2024-06-24 VITALS
HEART RATE: 59 BPM | BODY MASS INDEX: 40.42 KG/M2 | OXYGEN SATURATION: 92 % | WEIGHT: 200.5 LBS | HEIGHT: 59 IN | DIASTOLIC BLOOD PRESSURE: 85 MMHG | TEMPERATURE: 96.3 F | SYSTOLIC BLOOD PRESSURE: 152 MMHG

## 2024-06-24 DIAGNOSIS — Z79.899 MEDICATION MANAGEMENT: ICD-10-CM

## 2024-06-24 DIAGNOSIS — I10 BENIGN ESSENTIAL HYPERTENSION: ICD-10-CM

## 2024-06-24 DIAGNOSIS — Z79.1 NSAID LONG-TERM USE: ICD-10-CM

## 2024-06-24 DIAGNOSIS — M17.12 PRIMARY OSTEOARTHRITIS OF LEFT KNEE: ICD-10-CM

## 2024-06-24 DIAGNOSIS — M35.9 UNDIFFERENTIATED CONNECTIVE TISSUE DISEASE (MULTI): Primary | ICD-10-CM

## 2024-06-24 DIAGNOSIS — E66.01 CLASS 3 SEVERE OBESITY DUE TO EXCESS CALORIES WITH SERIOUS COMORBIDITY AND BODY MASS INDEX (BMI) OF 40.0 TO 44.9 IN ADULT (MULTI): ICD-10-CM

## 2024-06-24 PROCEDURE — 1159F MED LIST DOCD IN RCRD: CPT | Performed by: INTERNAL MEDICINE

## 2024-06-24 PROCEDURE — 80354 DRUG SCREENING FENTANYL: CPT

## 2024-06-24 PROCEDURE — 99214 OFFICE O/P EST MOD 30 MIN: CPT | Performed by: INTERNAL MEDICINE

## 2024-06-24 PROCEDURE — 80373 DRUG SCREENING TRAMADOL: CPT

## 2024-06-24 PROCEDURE — 3077F SYST BP >= 140 MM HG: CPT | Performed by: INTERNAL MEDICINE

## 2024-06-24 PROCEDURE — 1123F ACP DISCUSS/DSCN MKR DOCD: CPT | Performed by: INTERNAL MEDICINE

## 2024-06-24 PROCEDURE — 1036F TOBACCO NON-USER: CPT | Performed by: INTERNAL MEDICINE

## 2024-06-24 PROCEDURE — 80346 BENZODIAZEPINES1-12: CPT

## 2024-06-24 PROCEDURE — 3079F DIAST BP 80-89 MM HG: CPT | Performed by: INTERNAL MEDICINE

## 2024-06-24 PROCEDURE — 80368 SEDATIVE HYPNOTICS: CPT

## 2024-06-24 PROCEDURE — 80307 DRUG TEST PRSMV CHEM ANLYZR: CPT

## 2024-06-24 PROCEDURE — 1160F RVW MEDS BY RX/DR IN RCRD: CPT | Performed by: INTERNAL MEDICINE

## 2024-06-24 PROCEDURE — 82570 ASSAY OF URINE CREATININE: CPT

## 2024-06-24 PROCEDURE — 80365 DRUG SCREENING OXYCODONE: CPT

## 2024-06-24 PROCEDURE — 80358 DRUG SCREENING METHADONE: CPT

## 2024-06-24 PROCEDURE — 3008F BODY MASS INDEX DOCD: CPT | Performed by: INTERNAL MEDICINE

## 2024-06-24 PROCEDURE — 80361 OPIATES 1 OR MORE: CPT

## 2024-06-24 ASSESSMENT — ENCOUNTER SYMPTOMS
WEAKNESS: 0
FEVER: 0
FATIGUE: 1
NUMBNESS: 0
COUGH: 0
COLOR CHANGE: 0
BACK PAIN: 0
ARTHRALGIAS: 1
MYALGIAS: 0
JOINT SWELLING: 1
SHORTNESS OF BREATH: 0

## 2024-06-24 NOTE — LETTER
June 24, 2024     Maryann Sewell DO  [address] [address]    Patient: Randee Munoz   YOB: 1955   Date of Visit: 6/24/2024       Dear Dr. Maryann Sewell DO:    Thank you for referring Randee Munoz to me for evaluation. Below are my notes for this visit.  If you have questions, please do not hesitate to call me. I look forward to following your patient along with you.       Sincerely,     Jesusita Gusman MD      CC: No Recipients  ______________________________________________________________________________________                                                    [facility] RHEUMATOLOGY     AND INTERNAL MEDICINE    RHEUMATOLOGY PROGRESS NOTE  Randee Munoz 68 y.o. female  Chief Complaint   Patient presents with   • UCTD       SUBJECTIVE  Primarily has issues with L knee.   +stiffness and difficulty walking when she first gets up.  After that, she is ok.    Also with worsening fatigue especially the last several weeks    Still getting hot flashes which are frustrating her      OARRS:  Reviewed today  I have personally reviewed the OARRS report for Randee Munoz. I have considered the risks of abuse, dependence, addiction and diversion and I believe that it is clinically appropriate for Randee Munoz to be prescribed this medication    Is the patient prescribed a combination of a benzodiazepine and opioid?  No    Last Urine Drug Screen / ordered today: Yes  Recent Results (from the past 8760 hour(s))   Confirmation Opiate/Opioid/Benzo Prescription Compliance    Collection Time: 04/03/24  8:42 AM   Result Value Ref Range    Clonazepam <25 <25 ng/mL    7-Aminoclonazepam <25 <25 ng/mL    Alprazolam <25 <25 ng/mL    Alpha-Hydroxyalprazolam <25 <25 ng/mL    Midazolam <25 <25 ng/mL    Alpha-Hydroxymidazolam <25 <25 ng/mL    Chlordiazepoxide <25 <25 ng/mL    Diazepam <25 <25 ng/mL    Nordiazepam <25 <25 ng/mL    Temazepam <25 <25 ng/mL     Oxazepam <25 <25 ng/mL    Lorazepam <25 <25 ng/mL    Methadone <25 <25 ng/mL    EDDP <25 <25 ng/mL    6-Acetylmorphine <25 <25 ng/mL    Codeine <50 <50 ng/mL    Hydrocodone 1,370 (H) <25 ng/mL    Hydromorphone 854 (H) <25 ng/mL    Morphine  <50 <50 ng/mL    Norhydrocodone >1,000 (H) <25 ng/mL    Noroxycodone <25 <25 ng/mL    Oxycodone <25 <25 ng/mL    Oxymorphone <25 <25 ng/mL    Fentanyl <2.5 <2.5 ng/mL    Norfentanyl <2.5 <2.5 ng/mL    Tramadol <50 <50 ng/mL    O-Desmethyltramadol <50 <50 ng/mL    Zolpidem <25 <25 ng/mL    Zolpidem Metabolite (ZCA) <25 <25 ng/mL   Screen Opiate/Opioid/Benzo Prescription Compliance    Collection Time: 04/03/24  8:42 AM   Result Value Ref Range    Creatinine, Urine Random 139.3 20.0 - 320.0 mg/dL    Amphetamine Screen, Urine Presumptive Negative Presumptive Negative    Barbiturate Screen, Urine Presumptive Negative Presumptive Negative    Cannabinoid Screen, Urine Presumptive Negative Presumptive Negative    Cocaine Metabolite Screen, Urine Presumptive Negative Presumptive Negative    PCP Screen, Urine Presumptive Negative Presumptive Negative     Results are as expected.         Controlled Substance Agreement:  Date of the Last Agreement: 10/23  Reviewed Controlled Substance Agreement including but not limited to the benefits, risks, and alternatives to treatment with a Controlled Substance medication(s).    Opioids:  What is the patient's goal of therapy? Pain relief  Is this being achieved with current treatment? yes    I have calculated the patient's Morphine Dose Equivalent (MED):   I have considered referral to Pain Management and/or a specialist, and do not feel it is necessary at this time.    I feel that it is clinically indicated to continue this current medication regimen after consideration of alternative therapies, and other non-opioid treatment.    Opioid Risk Screening:  No change in risk    Pain Assessment:  Controlled substance questionnaire    On scale of  0-10  -pain on average over the last week? 8  -pain at its worst over the last week? 8  %pain relieved by meds? 98  Amount of pain relief with meds make a difference? yes  MD agrees with efficacy of med? yes    Better/same/worse  Physical functioning better  Family relationships better  Social relationships better  Mood better    Sleep pattern better  Overall functioning better  Side effects? no    Records since last seen reviewed in [facility], [facility] and [facility] Record  Patient Active Problem List    Diagnosis Date Noted   • NSAID long-term use 2024   • Primary osteoarthritis of left knee 2024   • Medication management 10/03/2023   • Benign essential hypertension 2023   • Stage 3a chronic kidney disease (Multi) 2023   • Undifferentiated connective tissue disease (Multi) 2023   • Class 3 severe obesity due to excess calories with serious comorbidity and body mass index (BMI) of 40.0 to 44.9 in adult (Multi) 2023   • Eczema 2023     Past Medical History:   Diagnosis Date   • COVID-19 2021    COVID-19 virus infection   • COVID-19 vaccine series declined    • Osteoarthritis of hip 2023   • Rosacea    • Trochanteric bursitis, left hip 10/16/2017    Trochanteric bursitis of left hip   • Unspecified fracture of lower end of unspecified humerus, initial encounter for closed fracture     Elbow fracture     Past Surgical History:   Procedure Laterality Date   •  SECTION, CLASSIC  2014     Section   • ELBOW SURGERY Right 2014    Treatment Of The Right Elbow   • KNEE ARTHROSCOPY W/ DEBRIDEMENT  2014    Arthroscopy Knee Right   • TOTAL HIP ARTHROPLASTY  2021    Hip replacement       Current Outpatient Medications   Medication Instructions   • cholecalciferol (Vitamin D-3) 1,250 mcg (50,000 unit) capsule 1 capsule, oral, Once Weekly   • etodolac (LODINE) 400 mg, oral, 2 times daily PRN   • hydroCHLOROthiazide (HYDRODIURIL) 25  "mg, oral, Daily   • HYDROcodone-acetaminophen (Norco) 7.5-325 mg tablet 1 tablet, oral, Every 6 hours PRN, pain   • triamcinolone (Kenalog) 0.1 % cream 1 Application, Topical, 2 times daily, 2 or 3 times daily     Allergies   Allergen Reactions   • Sulfa (Sulfonamide Antibiotics) Hives   • Aspirin Other     sensitive   • Opioids - Morphine Analogues Nausea/vomiting   • Clindamycin Hives and Rash     Review of Systems   Constitutional:  Positive for fatigue. Negative for fever.   Respiratory:  Negative for cough and shortness of breath.    Cardiovascular:  Negative for leg swelling.   Musculoskeletal:  Positive for arthralgias, gait problem and joint swelling. Negative for back pain and myalgias.   Skin:  Negative for color change and rash.   Neurological:  Negative for weakness and numbness.     Social History     Tobacco Use   • Smoking status: Never   • Smokeless tobacco: Never   Substance Use Topics   • Alcohol use: Not Currently   • Drug use: Never     PHYSICAL EXAM  /85   Pulse 59   Temp 35.7 °C (96.3 °F)   Ht 1.499 m (4' 11\")   Wt 90.9 kg (200 lb 8 oz)   SpO2 92%   BMI 40.50 kg/m²   Physical Exam  Vitals reviewed.   Constitutional:       General: She is not in acute distress.     Appearance: Normal appearance.   HENT:      Head: Normocephalic and atraumatic.   Eyes:      General: No scleral icterus.     Extraocular Movements: Extraocular movements intact.      Conjunctiva/sclera: Conjunctivae normal.      Pupils: Pupils are equal, round, and reactive to light.   Neck:      Vascular: No carotid bruit.   Cardiovascular:      Rate and Rhythm: Normal rate and regular rhythm.      Pulses: Normal pulses.      Heart sounds: Normal heart sounds. No murmur heard.     No friction rub. No gallop.   Pulmonary:      Effort: Pulmonary effort is normal. No respiratory distress.      Breath sounds: Normal breath sounds.   Musculoskeletal:         General: Swelling, tenderness and deformity present.      Cervical " back: Normal range of motion and neck supple. No tenderness.      Right lower leg: No edema.      Left lower leg: No edema.      Comments: Valgus deformity L knee  Soft tissue swelling in suprapatellar area (not in bursa)  Medial joint line tenderness   Lymphadenopathy:      Cervical: No cervical adenopathy.   Skin:     Findings: No erythema or rash.   Neurological:      General: No focal deficit present.      Mental Status: She is alert and oriented to person, place, and time. Mental status is at baseline.   Psychiatric:         Mood and Affect: Mood normal.       Health Maintenance Due   Topic Date Due   • Lipid Panel  Never done   • Medicare Annual Wellness Visit (AWV)  Never done   • Bone Density Scan  Never done   • Colorectal Cancer Screening  Never done   • Hepatitis C Screening  Never done   • Diabetes Screening  Never done   • CKD: Urine Protein Screening  Never done   • Zoster Vaccines (1 of 2) Never done   • RSV Pregnant patients and/or  patients aged 60+ years (1 - 1-dose 60+ series) Never done   • Pneumococcal Vaccine: 65+ Years (2 of 2 - PCV) 02/01/2022   • Mammogram  12/09/2022     Assessment/plan  Problem List Items Addressed This Visit       Benign essential hypertension    Current Assessment & Plan     Elevated systolic.     Continue HCTZ         Undifferentiated connective tissue disease (Multi) - Primary    Current Assessment & Plan     On NSAID therapy.   Has increasing fatigue.  Labs ordered today to monitor for increased disease activity, end organ manifestations and to monitor for any drug toxicities due to chronic medications           Relevant Orders    CBC and Auto Differential    Comprehensive Metabolic Panel    C-Reactive Protein    Sedimentation Rate    ARIC with Reflex to MARY    Class 3 severe obesity due to excess calories with serious comorbidity and body mass index (BMI) of 40.0 to 44.9 in adult (Multi)    Medication management    Overview     CSA 10/23  UDS 6/23         Current  Assessment & Plan     Review of CSA done and  to continue to adhere to policy  CSA updated in EMR:             Relevant Orders    Opiate/Opioid/Benzo Prescription Compliance    Primary osteoarthritis of left knee    Current Assessment & Plan     Advised on ROM exercises         NSAID long-term use     Follow up: ___4__months

## 2024-06-24 NOTE — PATIENT INSTRUCTIONS
It was a pleasure to see you today  Please call if your symptoms worsen  Please review your summary for education and reminders.  Follow up at your next appointment.    If you had labs/xrays done today, you will be able to view on [employer].   We will contact you when the results are reviewed for further discussion.  Please note that you may receive your results before I have had a chance to review.  Please know I will be contacting you for discussion  Homegoing instructions for all patient  A healthy lifestyle helps chronic diseases  These are all the goals you should strive to improve your overall health   Blood pressure <130/85   BMI of <30 or waist circumference that is 1/2 of your height   Fasting blood sugar <107 (if you are diabetic, aim for an A1c <6.4%_   LDL cholesterol <130   Avoid smoking   Manage your stress   Get your preventive exams   Get your immunizations     Current weight: 90.9 kg (200 lb 8 oz)  Weight change since last visit (-) denotes wt loss -4.3 lbs   Weight loss needed to achieve BMI 25: 77 Lbs  Weight loss needed to achieve BMI 30: 52.3 Lbs

## 2024-06-24 NOTE — ASSESSMENT & PLAN NOTE
On NSAID therapy.   Has increasing fatigue.  Labs ordered today to monitor for increased disease activity, end organ manifestations and to monitor for any drug toxicities due to chronic medications

## 2024-06-24 NOTE — PROGRESS NOTES
[facility] RHEUMATOLOGY     AND INTERNAL MEDICINE    RHEUMATOLOGY PROGRESS NOTE  Randee Munoz 68 y.o. female  Chief Complaint   Patient presents with    UCTD       SUBJECTIVE  Primarily has issues with L knee.   +stiffness and difficulty walking when she first gets up.  After that, she is ok.    Also with worsening fatigue especially the last several weeks    Still getting hot flashes which are frustrating her      OARRS:  Reviewed today  I have personally reviewed the OARRS report for Randee Munoz. I have considered the risks of abuse, dependence, addiction and diversion and I believe that it is clinically appropriate for Randee Munoz to be prescribed this medication    Is the patient prescribed a combination of a benzodiazepine and opioid?  No    Last Urine Drug Screen / ordered today: Yes  Recent Results (from the past 8760 hour(s))   Confirmation Opiate/Opioid/Benzo Prescription Compliance    Collection Time: 04/03/24  8:42 AM   Result Value Ref Range    Clonazepam <25 <25 ng/mL    7-Aminoclonazepam <25 <25 ng/mL    Alprazolam <25 <25 ng/mL    Alpha-Hydroxyalprazolam <25 <25 ng/mL    Midazolam <25 <25 ng/mL    Alpha-Hydroxymidazolam <25 <25 ng/mL    Chlordiazepoxide <25 <25 ng/mL    Diazepam <25 <25 ng/mL    Nordiazepam <25 <25 ng/mL    Temazepam <25 <25 ng/mL    Oxazepam <25 <25 ng/mL    Lorazepam <25 <25 ng/mL    Methadone <25 <25 ng/mL    EDDP <25 <25 ng/mL    6-Acetylmorphine <25 <25 ng/mL    Codeine <50 <50 ng/mL    Hydrocodone 1,370 (H) <25 ng/mL    Hydromorphone 854 (H) <25 ng/mL    Morphine  <50 <50 ng/mL    Norhydrocodone >1,000 (H) <25 ng/mL    Noroxycodone <25 <25 ng/mL    Oxycodone <25 <25 ng/mL    Oxymorphone <25 <25 ng/mL    Fentanyl <2.5 <2.5 ng/mL    Norfentanyl <2.5 <2.5 ng/mL    Tramadol <50 <50 ng/mL    O-Desmethyltramadol <50 <50 ng/mL    Zolpidem <25 <25 ng/mL    Zolpidem Metabolite (ZCA) <25 <25 ng/mL   Screen  Opiate/Opioid/Benzo Prescription Compliance    Collection Time: 04/03/24  8:42 AM   Result Value Ref Range    Creatinine, Urine Random 139.3 20.0 - 320.0 mg/dL    Amphetamine Screen, Urine Presumptive Negative Presumptive Negative    Barbiturate Screen, Urine Presumptive Negative Presumptive Negative    Cannabinoid Screen, Urine Presumptive Negative Presumptive Negative    Cocaine Metabolite Screen, Urine Presumptive Negative Presumptive Negative    PCP Screen, Urine Presumptive Negative Presumptive Negative     Results are as expected.         Controlled Substance Agreement:  Date of the Last Agreement: 10/23  Reviewed Controlled Substance Agreement including but not limited to the benefits, risks, and alternatives to treatment with a Controlled Substance medication(s).    Opioids:  What is the patient's goal of therapy? Pain relief  Is this being achieved with current treatment? yes    I have calculated the patient's Morphine Dose Equivalent (MED):   I have considered referral to Pain Management and/or a specialist, and do not feel it is necessary at this time.    I feel that it is clinically indicated to continue this current medication regimen after consideration of alternative therapies, and other non-opioid treatment.    Opioid Risk Screening:  No change in risk    Pain Assessment:  Controlled substance questionnaire    On scale of 0-10  -pain on average over the last week? 8  -pain at its worst over the last week? 8  %pain relieved by meds? 98  Amount of pain relief with meds make a difference? yes  MD agrees with efficacy of med? yes    Better/same/worse  Physical functioning better  Family relationships better  Social relationships better  Mood better    Sleep pattern better  Overall functioning better  Side effects? no    Records since last seen reviewed in [facility], [facility] and [facility] Record  Patient Active Problem List    Diagnosis Date Noted    NSAID long-term use 06/22/2024    Primary  osteoarthritis of left knee 2024    Medication management 10/03/2023    Benign essential hypertension 2023    Stage 3a chronic kidney disease (Multi) 2023    Undifferentiated connective tissue disease (Multi) 2023    Class 3 severe obesity due to excess calories with serious comorbidity and body mass index (BMI) of 40.0 to 44.9 in adult (Multi) 2023    Eczema 2023     Past Medical History:   Diagnosis Date    COVID-19 2021    COVID-19 virus infection    COVID-19 vaccine series declined     Osteoarthritis of hip 2023    Rosacea     Trochanteric bursitis, left hip 10/16/2017    Trochanteric bursitis of left hip    Unspecified fracture of lower end of unspecified humerus, initial encounter for closed fracture     Elbow fracture     Past Surgical History:   Procedure Laterality Date     SECTION, CLASSIC  2014     Section    ELBOW SURGERY Right 2014    Treatment Of The Right Elbow    KNEE ARTHROSCOPY W/ DEBRIDEMENT  2014    Arthroscopy Knee Right    TOTAL HIP ARTHROPLASTY  2021    Hip replacement       Current Outpatient Medications   Medication Instructions    cholecalciferol (Vitamin D-3) 1,250 mcg (50,000 unit) capsule 1 capsule, oral, Once Weekly    etodolac (LODINE) 400 mg, oral, 2 times daily PRN    hydroCHLOROthiazide (HYDRODIURIL) 25 mg, oral, Daily    HYDROcodone-acetaminophen (Norco) 7.5-325 mg tablet 1 tablet, oral, Every 6 hours PRN, pain    triamcinolone (Kenalog) 0.1 % cream 1 Application, Topical, 2 times daily, 2 or 3 times daily     Allergies   Allergen Reactions    Sulfa (Sulfonamide Antibiotics) Hives    Aspirin Other     sensitive    Opioids - Morphine Analogues Nausea/vomiting    Clindamycin Hives and Rash     Review of Systems   Constitutional:  Positive for fatigue. Negative for fever.   Respiratory:  Negative for cough and shortness of breath.    Cardiovascular:  Negative for leg swelling.   Musculoskeletal:   "Positive for arthralgias, gait problem and joint swelling. Negative for back pain and myalgias.   Skin:  Negative for color change and rash.   Neurological:  Negative for weakness and numbness.     Social History     Tobacco Use    Smoking status: Never    Smokeless tobacco: Never   Substance Use Topics    Alcohol use: Not Currently    Drug use: Never     PHYSICAL EXAM  /85   Pulse 59   Temp 35.7 °C (96.3 °F)   Ht 1.499 m (4' 11\")   Wt 90.9 kg (200 lb 8 oz)   SpO2 92%   BMI 40.50 kg/m²   Physical Exam  Vitals reviewed.   Constitutional:       General: She is not in acute distress.     Appearance: Normal appearance.   HENT:      Head: Normocephalic and atraumatic.   Eyes:      General: No scleral icterus.     Extraocular Movements: Extraocular movements intact.      Conjunctiva/sclera: Conjunctivae normal.      Pupils: Pupils are equal, round, and reactive to light.   Neck:      Vascular: No carotid bruit.   Cardiovascular:      Rate and Rhythm: Normal rate and regular rhythm.      Pulses: Normal pulses.      Heart sounds: Normal heart sounds. No murmur heard.     No friction rub. No gallop.   Pulmonary:      Effort: Pulmonary effort is normal. No respiratory distress.      Breath sounds: Normal breath sounds.   Musculoskeletal:         General: Swelling, tenderness and deformity present.      Cervical back: Normal range of motion and neck supple. No tenderness.      Right lower leg: No edema.      Left lower leg: No edema.      Comments: Valgus deformity L knee  Soft tissue swelling in suprapatellar area (not in bursa)  Medial joint line tenderness   Lymphadenopathy:      Cervical: No cervical adenopathy.   Skin:     Findings: No erythema or rash.   Neurological:      General: No focal deficit present.      Mental Status: She is alert and oriented to person, place, and time. Mental status is at baseline.   Psychiatric:         Mood and Affect: Mood normal.       Health Maintenance Due   Topic Date Due    " Lipid Panel  Never done    Medicare Annual Wellness Visit (AWV)  Never done    Bone Density Scan  Never done    Colorectal Cancer Screening  Never done    Hepatitis C Screening  Never done    Diabetes Screening  Never done    CKD: Urine Protein Screening  Never done    Zoster Vaccines (1 of 2) Never done    RSV Pregnant patients and/or  patients aged 60+ years (1 - 1-dose 60+ series) Never done    Pneumococcal Vaccine: 65+ Years (2 of 2 - PCV) 02/01/2022    Mammogram  12/09/2022     Assessment/plan  Problem List Items Addressed This Visit       Benign essential hypertension    Current Assessment & Plan     Elevated systolic.     Continue HCTZ         Undifferentiated connective tissue disease (Multi) - Primary    Current Assessment & Plan     On NSAID therapy.   Has increasing fatigue.  Labs ordered today to monitor for increased disease activity, end organ manifestations and to monitor for any drug toxicities due to chronic medications           Relevant Orders    CBC and Auto Differential    Comprehensive Metabolic Panel    C-Reactive Protein    Sedimentation Rate    ARIC with Reflex to MARY    Class 3 severe obesity due to excess calories with serious comorbidity and body mass index (BMI) of 40.0 to 44.9 in adult (Multi)    Medication management    Overview     CSA 10/23  UDS 6/23         Current Assessment & Plan     Review of CSA done and  to continue to adhere to policy  CSA updated in EMR:             Relevant Orders    Opiate/Opioid/Benzo Prescription Compliance    Primary osteoarthritis of left knee    Current Assessment & Plan     Advised on ROM exercises         NSAID long-term use     Follow up: ___4__months

## 2024-06-25 LAB
AMPHETAMINES UR QL SCN: NORMAL
BARBITURATES UR QL SCN: NORMAL
BZE UR QL SCN: NORMAL
CANNABINOIDS UR QL SCN: NORMAL
CREAT UR-MCNC: 126.5 MG/DL (ref 20–320)
PCP UR QL SCN: NORMAL

## 2024-06-27 LAB
1OH-MIDAZOLAM UR CFM-MCNC: <25 NG/ML
6MAM UR CFM-MCNC: <25 NG/ML
7AMINOCLONAZEPAM UR CFM-MCNC: <25 NG/ML
A-OH ALPRAZ UR CFM-MCNC: <25 NG/ML
ALPRAZ UR CFM-MCNC: <25 NG/ML
CHLORDIAZEP UR CFM-MCNC: <25 NG/ML
CLONAZEPAM UR CFM-MCNC: <25 NG/ML
CODEINE UR CFM-MCNC: <50 NG/ML
DIAZEPAM UR CFM-MCNC: <25 NG/ML
EDDP UR CFM-MCNC: <25 NG/ML
FENTANYL UR CFM-MCNC: <2.5 NG/ML
HYDROCODONE CTO UR CFM-MCNC: 1636 NG/ML
HYDROMORPHONE UR CFM-MCNC: 867 NG/ML
LORAZEPAM UR CFM-MCNC: <25 NG/ML
METHADONE UR CFM-MCNC: <25 NG/ML
MIDAZOLAM UR CFM-MCNC: <25 NG/ML
MORPHINE UR CFM-MCNC: <50 NG/ML
NORDIAZEPAM UR CFM-MCNC: <25 NG/ML
NORFENTANYL UR CFM-MCNC: <2.5 NG/ML
NORHYDROCODONE UR CFM-MCNC: >1000 NG/ML
NOROXYCODONE UR CFM-MCNC: <25 NG/ML
NORTRAMADOL UR-MCNC: <50 NG/ML
OXAZEPAM UR CFM-MCNC: <25 NG/ML
OXYCODONE UR CFM-MCNC: <25 NG/ML
OXYMORPHONE UR CFM-MCNC: <25 NG/ML
TEMAZEPAM UR CFM-MCNC: <25 NG/ML
TRAMADOL UR CFM-MCNC: <50 NG/ML
ZOLPIDEM UR CFM-MCNC: <25 NG/ML
ZOLPIDEM UR-MCNC: <25 NG/ML

## 2024-07-05 ENCOUNTER — LAB (OUTPATIENT)
Dept: LAB | Facility: LAB | Age: 69
End: 2024-07-05
Payer: MEDICARE

## 2024-07-05 DIAGNOSIS — M35.9 UNDIFFERENTIATED CONNECTIVE TISSUE DISEASE (MULTI): ICD-10-CM

## 2024-07-05 LAB
ALBUMIN SERPL BCP-MCNC: 4.2 G/DL (ref 3.4–5)
ALP SERPL-CCNC: 105 U/L (ref 33–136)
ALT SERPL W P-5'-P-CCNC: 14 U/L (ref 7–45)
ANION GAP SERPL CALC-SCNC: 11 MMOL/L (ref 10–20)
AST SERPL W P-5'-P-CCNC: 15 U/L (ref 9–39)
BASOPHILS # BLD AUTO: 0.03 X10*3/UL (ref 0–0.1)
BASOPHILS NFR BLD AUTO: 0.3 %
BILIRUB SERPL-MCNC: 0.4 MG/DL (ref 0–1.2)
BUN SERPL-MCNC: 22 MG/DL (ref 6–23)
CALCIUM SERPL-MCNC: 9 MG/DL (ref 8.6–10.3)
CHLORIDE SERPL-SCNC: 103 MMOL/L (ref 98–107)
CO2 SERPL-SCNC: 30 MMOL/L (ref 21–32)
CREAT SERPL-MCNC: 1.19 MG/DL (ref 0.5–1.05)
CRP SERPL-MCNC: 1.67 MG/DL
EGFRCR SERPLBLD CKD-EPI 2021: 49 ML/MIN/1.73M*2
EOSINOPHIL # BLD AUTO: 0.11 X10*3/UL (ref 0–0.7)
EOSINOPHIL NFR BLD AUTO: 1.2 %
ERYTHROCYTE [DISTWIDTH] IN BLOOD BY AUTOMATED COUNT: 13.2 % (ref 11.5–14.5)
ERYTHROCYTE [SEDIMENTATION RATE] IN BLOOD BY WESTERGREN METHOD: 69 MM/H (ref 0–30)
GLUCOSE SERPL-MCNC: 104 MG/DL (ref 74–99)
HCT VFR BLD AUTO: 37.8 % (ref 36–46)
HGB BLD-MCNC: 11.9 G/DL (ref 12–16)
IMM GRANULOCYTES # BLD AUTO: 0.05 X10*3/UL (ref 0–0.7)
IMM GRANULOCYTES NFR BLD AUTO: 0.6 % (ref 0–0.9)
LYMPHOCYTES # BLD AUTO: 1.3 X10*3/UL (ref 1.2–4.8)
LYMPHOCYTES NFR BLD AUTO: 14.6 %
MCH RBC QN AUTO: 30.2 PG (ref 26–34)
MCHC RBC AUTO-ENTMCNC: 31.5 G/DL (ref 32–36)
MCV RBC AUTO: 96 FL (ref 80–100)
MONOCYTES # BLD AUTO: 0.78 X10*3/UL (ref 0.1–1)
MONOCYTES NFR BLD AUTO: 8.7 %
NEUTROPHILS # BLD AUTO: 6.66 X10*3/UL (ref 1.2–7.7)
NEUTROPHILS NFR BLD AUTO: 74.6 %
NRBC BLD-RTO: 0 /100 WBCS (ref 0–0)
PLATELET # BLD AUTO: 249 X10*3/UL (ref 150–450)
POTASSIUM SERPL-SCNC: 4.4 MMOL/L (ref 3.5–5.3)
PROT SERPL-MCNC: 6.6 G/DL (ref 6.4–8.2)
RBC # BLD AUTO: 3.94 X10*6/UL (ref 4–5.2)
SODIUM SERPL-SCNC: 140 MMOL/L (ref 136–145)
WBC # BLD AUTO: 8.9 X10*3/UL (ref 4.4–11.3)

## 2024-07-05 PROCEDURE — 86225 DNA ANTIBODY NATIVE: CPT

## 2024-07-05 PROCEDURE — 86235 NUCLEAR ANTIGEN ANTIBODY: CPT

## 2024-07-05 PROCEDURE — 86038 ANTINUCLEAR ANTIBODIES: CPT

## 2024-07-05 PROCEDURE — 85652 RBC SED RATE AUTOMATED: CPT

## 2024-07-05 PROCEDURE — 85025 COMPLETE CBC W/AUTO DIFF WBC: CPT

## 2024-07-05 PROCEDURE — 80053 COMPREHEN METABOLIC PANEL: CPT

## 2024-07-05 PROCEDURE — 86140 C-REACTIVE PROTEIN: CPT

## 2024-07-05 PROCEDURE — 36415 COLL VENOUS BLD VENIPUNCTURE: CPT

## 2024-07-09 LAB
ANA PATTERN: ABNORMAL
ANA SER QL HEP2 SUBST: POSITIVE
ANA TITR SER IF: ABNORMAL {TITER}
CENTROMERE B AB SER-ACNC: <0.2 AI
CHROMATIN AB SERPL-ACNC: <0.2 AI
DSDNA AB SER-ACNC: 22 IU/ML
ENA JO1 AB SER QL IA: <0.2 AI
ENA RNP AB SER IA-ACNC: 0.2 AI
ENA SCL70 AB SER QL IA: <0.2 AI
ENA SM AB SER IA-ACNC: <0.2 AI
ENA SM+RNP AB SER QL IA: <0.2 AI
ENA SS-A AB SER IA-ACNC: <0.2 AI
ENA SS-B AB SER IA-ACNC: <0.2 AI
RIBOSOMAL P AB SER-ACNC: <0.2 AI

## 2024-07-11 DIAGNOSIS — M35.9 UNDIFFERENTIATED CONNECTIVE TISSUE DISEASE (MULTI): ICD-10-CM

## 2024-07-11 RX ORDER — HYDROCODONE BITARTRATE AND ACETAMINOPHEN 7.5; 325 MG/1; MG/1
1 TABLET ORAL EVERY 6 HOURS PRN
Qty: 120 TABLET | Refills: 0 | Status: SHIPPED | OUTPATIENT
Start: 2024-07-14 | End: 2025-07-14

## 2024-07-29 ENCOUNTER — TELEPHONE (OUTPATIENT)
Dept: RHEUMATOLOGY | Facility: CLINIC | Age: 69
End: 2024-07-29
Payer: MEDICARE

## 2024-07-29 DIAGNOSIS — M35.9 UNDIFFERENTIATED CONNECTIVE TISSUE DISEASE (MULTI): Primary | ICD-10-CM

## 2024-07-29 RX ORDER — ETODOLAC 400 MG/1
400 TABLET, FILM COATED ORAL 2 TIMES DAILY PRN
Qty: 180 TABLET | Refills: 3 | Status: SHIPPED | OUTPATIENT
Start: 2024-07-29 | End: 2025-07-29

## 2024-07-29 NOTE — TELEPHONE ENCOUNTER
Patient called and left a message on our voicemail requesting     Rx refill-Etodolac 400 mg    Pharmacy:   [facility] [address] - [place], [place] - [address]  [facility] [address]  [facility] [address]  Phone: [phone] Fax: [phone]    Patient phone: [phone]

## 2024-08-15 ENCOUNTER — TELEPHONE (OUTPATIENT)
Dept: RHEUMATOLOGY | Facility: CLINIC | Age: 69
End: 2024-08-15
Payer: MEDICARE

## 2024-08-15 DIAGNOSIS — M35.9 UNDIFFERENTIATED CONNECTIVE TISSUE DISEASE (MULTI): ICD-10-CM

## 2024-08-15 RX ORDER — HYDROCODONE BITARTRATE AND ACETAMINOPHEN 7.5; 325 MG/1; MG/1
1 TABLET ORAL EVERY 6 HOURS PRN
Qty: 120 TABLET | Refills: 0 | Status: SHIPPED | OUTPATIENT
Start: 2024-08-15 | End: 2025-08-15

## 2024-09-16 ENCOUNTER — TELEPHONE (OUTPATIENT)
Dept: PRIMARY CARE | Facility: CLINIC | Age: 69
End: 2024-09-16
Payer: MEDICARE

## 2024-09-16 DIAGNOSIS — M35.9 UNDIFFERENTIATED CONNECTIVE TISSUE DISEASE (MULTI): ICD-10-CM

## 2024-09-16 RX ORDER — HYDROCODONE BITARTRATE AND ACETAMINOPHEN 7.5; 325 MG/1; MG/1
1 TABLET ORAL EVERY 6 HOURS PRN
Qty: 120 TABLET | Refills: 0 | Status: SHIPPED | OUTPATIENT
Start: 2024-09-16 | End: 2025-09-16

## 2024-09-16 NOTE — TELEPHONE ENCOUNTER
RX Refill- Norco 7.5- 325 mg    [facility] [id number] - [place], [place] - [address]  [address]  [facility] [address]  Phone: [phone] Fax: [phone]

## 2024-09-30 ENCOUNTER — APPOINTMENT (OUTPATIENT)
Dept: RHEUMATOLOGY | Facility: CLINIC | Age: 69
End: 2024-09-30
Payer: MEDICARE

## 2024-09-30 VITALS
OXYGEN SATURATION: 89 % | HEIGHT: 59 IN | DIASTOLIC BLOOD PRESSURE: 86 MMHG | SYSTOLIC BLOOD PRESSURE: 181 MMHG | HEART RATE: 67 BPM | BODY MASS INDEX: 41.51 KG/M2 | WEIGHT: 205.9 LBS | TEMPERATURE: 96.8 F

## 2024-09-30 DIAGNOSIS — Z79.899 MEDICATION MANAGEMENT: ICD-10-CM

## 2024-09-30 DIAGNOSIS — Z79.1 NSAID LONG-TERM USE: ICD-10-CM

## 2024-09-30 DIAGNOSIS — M35.9 UNDIFFERENTIATED CONNECTIVE TISSUE DISEASE (MULTI): Primary | ICD-10-CM

## 2024-09-30 DIAGNOSIS — I10 BENIGN ESSENTIAL HYPERTENSION: ICD-10-CM

## 2024-09-30 PROCEDURE — 99214 OFFICE O/P EST MOD 30 MIN: CPT | Performed by: INTERNAL MEDICINE

## 2024-09-30 PROCEDURE — 3079F DIAST BP 80-89 MM HG: CPT | Performed by: INTERNAL MEDICINE

## 2024-09-30 PROCEDURE — 1123F ACP DISCUSS/DSCN MKR DOCD: CPT | Performed by: INTERNAL MEDICINE

## 2024-09-30 PROCEDURE — 3077F SYST BP >= 140 MM HG: CPT | Performed by: INTERNAL MEDICINE

## 2024-09-30 PROCEDURE — 1036F TOBACCO NON-USER: CPT | Performed by: INTERNAL MEDICINE

## 2024-09-30 PROCEDURE — 1159F MED LIST DOCD IN RCRD: CPT | Performed by: INTERNAL MEDICINE

## 2024-09-30 PROCEDURE — 3008F BODY MASS INDEX DOCD: CPT | Performed by: INTERNAL MEDICINE

## 2024-09-30 PROCEDURE — 1160F RVW MEDS BY RX/DR IN RCRD: CPT | Performed by: INTERNAL MEDICINE

## 2024-09-30 ASSESSMENT — ENCOUNTER SYMPTOMS
NUMBNESS: 0
SHORTNESS OF BREATH: 0
WEAKNESS: 0
BACK PAIN: 0
COUGH: 0
MYALGIAS: 1
ARTHRALGIAS: 1
PALPITATIONS: 0
COLOR CHANGE: 0
FEVER: 0
FATIGUE: 0
JOINT SWELLING: 0

## 2024-09-30 NOTE — PATIENT INSTRUCTIONS
It was a pleasure to see you today  Please call if your symptoms worsen  Please review your summary for education and reminders.  Follow up at your next appointment.    If you had labs/xrays done today, you will be able to view on [employer].   We will contact you when the results are reviewed for further discussion.  Please note that you may receive your results before I have had a chance to review.  Please know I will be contacting you for discussion  Homegoing instructions for all patient  A healthy lifestyle helps chronic diseases  These are all the goals you should strive to improve your overall health   Blood pressure <130/85   BMI of <30 or waist circumference that is 1/2 of your height   Fasting blood sugar <107 (if you are diabetic, aim for an A1c <6.4%_   LDL cholesterol <130   Avoid smoking   Manage your stress   Get your preventive exams   Get your immunizations

## 2024-09-30 NOTE — PROGRESS NOTES
[facility] RHEUMATOLOGY     AND INTERNAL MEDICINE    RHEUMATOLOGY PROGRESS NOTE  Randee Munoz 69 y.o. female  Chief Complaint   Patient presents with    UCTD       SUBJECTIVE  Pt reports arthritis isn't too bad lately and feels better.  BP noted to be high.   Hasn't seen PCP in awhile.  Admits to eating a lot of salt daily; is taking her medication    OARRS:  Jesusita Gusman MD on 9/30/2024 10:05 AM  I have personally reviewed the OARRS report for Randee Munoz. I have considered the risks of abuse, dependence, addiction and diversion and I believe that it is clinically appropriate for Randee Munoz to be prescribed this medication    Is the patient prescribed a combination of a benzodiazepine and opioid?  No    Last Urine Drug Screen / ordered today: Yes  Recent Results (from the past 8760 hour(s))   Confirmation Opiate/Opioid/Benzo Prescription Compliance    Collection Time: 06/24/24  8:33 AM   Result Value Ref Range    Clonazepam <25 <25 ng/mL    7-Aminoclonazepam <25 <25 ng/mL    Alprazolam <25 <25 ng/mL    Alpha-Hydroxyalprazolam <25 <25 ng/mL    Midazolam <25 <25 ng/mL    Alpha-Hydroxymidazolam <25 <25 ng/mL    Chlordiazepoxide <25 <25 ng/mL    Diazepam <25 <25 ng/mL    Nordiazepam <25 <25 ng/mL    Temazepam <25 <25 ng/mL    Oxazepam <25 <25 ng/mL    Lorazepam <25 <25 ng/mL    Methadone <25 <25 ng/mL    EDDP <25 <25 ng/mL    6-Acetylmorphine <25 <25 ng/mL    Codeine <50 <50 ng/mL    Hydrocodone 1,636 (H) <25 ng/mL    Hydromorphone 867 (H) <25 ng/mL    Morphine  <50 <50 ng/mL    Norhydrocodone >1,000 (H) <25 ng/mL    Noroxycodone <25 <25 ng/mL    Oxycodone <25 <25 ng/mL    Oxymorphone <25 <25 ng/mL    Fentanyl <2.5 <2.5 ng/mL    Norfentanyl <2.5 <2.5 ng/mL    Tramadol <50 <50 ng/mL    O-Desmethyltramadol <50 <50 ng/mL    Zolpidem <25 <25 ng/mL    Zolpidem Metabolite (ZCA) <25 <25 ng/mL   Screen Opiate/Opioid/Benzo Prescription Compliance     Collection Time: 06/24/24  8:33 AM   Result Value Ref Range    Creatinine, Urine Random 126.5 20.0 - 320.0 mg/dL    Amphetamine Screen, Urine Presumptive Negative Presumptive Negative    Barbiturate Screen, Urine Presumptive Negative Presumptive Negative    Cannabinoid Screen, Urine Presumptive Negative Presumptive Negative    Cocaine Metabolite Screen, Urine Presumptive Negative Presumptive Negative    PCP Screen, Urine Presumptive Negative Presumptive Negative     Results are as expected.         Controlled Substance Agreement:  Date of the Last Agreement: 10/23  Reviewed Controlled Substance Agreement including but not limited to the benefits, risks, and alternatives to treatment with a Controlled Substance medication(s).    Opioids:  What is the patient's goal of therapy? Pain relief  Is this being achieved with current treatment? yes    I have calculated the patient's Morphine Dose Equivalent (MED):   I have considered referral to Pain Management and/or a specialist, and do not feel it is necessary at this time.    I feel that it is clinically indicated to continue this current medication regimen after consideration of alternative therapies, and other non-opioid treatment.    Opioid Risk Screening:  no    Pain Assessment:  Controlled substance questionnaire    On scale of 0-10  -pain on average over the last week? 6   -pain at its worst over the last week?7  %pain relieved by meds? 100  Amount of pain relief with meds make a difference? yes  MD agrees with efficacy of med? yes    Better/same/worse  Physical functioning same  Family relationships same  Social relationships same  Mood same  Sleep pattern same  Overall functioning same  Side effects? no    [facility] last [facility] and [facility] Record  Patient Active Problem List    Diagnosis Date Noted    NSAID long-term use 06/22/2024    Primary osteoarthritis of left knee 04/03/2024    Medication management 10/03/2023     Benign essential hypertension 08/23/2023    Stage 3a chronic kidney disease (Multi) 08/23/2023    Undifferentiated connective tissue disease (Multi) 08/23/2023    Class 3 severe obesity due to excess calories with serious comorbidity and body mass index (BMI) of 40.0 to 44.9 in adult (Multi) 08/23/2023    Eczema 08/23/2023     Past Medical History:   Diagnosis Date    COVID-19 12/29/2021    COVID-19 virus infection    COVID-19 vaccine series declined     Osteoarthritis of hip 08/23/2023    Rosacea     Trochanteric bursitis, left hip 10/16/2017    Trochanteric bursitis of left hip    Unspecified fracture of lower end of unspecified humerus, initial encounter for closed fracture     Elbow fracture     Current Outpatient Medications on File Prior to Visit   Medication Sig Dispense Refill    cholecalciferol (Vitamin D-3) 1,250 mcg (50,000 unit) capsule Take 1 capsule (50,000 Units) by mouth 1 (one) time per week.      etodolac (Lodine) 400 mg tablet Take 1 tablet (400 mg) by mouth 2 times a day as needed (pain). 180 tablet 3    hydroCHLOROthiazide (HYDRODiuril) 25 mg tablet Take 1 tablet (25 mg) by mouth once daily. 90 tablet 3    HYDROcodone-acetaminophen (Norco) 7.5-325 mg tablet Take 1 tablet by mouth every 6 hours if needed for moderate pain (4 - 6). pain 120 tablet 0    triamcinolone (Kenalog) 0.1 % cream Apply 1 Application topically 2 times a day. 2 or 3 times daily 45 g 1     No current facility-administered medications on file prior to visit.     Allergies   Allergen Reactions    Sulfa (Sulfonamide Antibiotics) Hives    Aspirin Other     sensitive    Opioids - Morphine Analogues Nausea/vomiting    Clindamycin Hives and Rash     Social History     Tobacco Use    Smoking status: Never    Smokeless tobacco: Never   Substance Use Topics    Alcohol use: Not Currently    Drug use: Never     Review of Systems   Constitutional:  Negative for fatigue and fever.   Respiratory:  Negative for cough and shortness of breath.  "   Cardiovascular:  Negative for chest pain, palpitations and leg swelling.   Musculoskeletal:  Positive for arthralgias and myalgias. Negative for back pain, gait problem and joint swelling.   Skin:  Negative for color change and rash.   Neurological:  Negative for weakness and numbness.       PHYSICAL EXAM  BP (!) 181/86 (BP Location: Left arm, Patient Position: Sitting, BP Cuff Size: Adult)   Pulse 67   Temp 36 °C (96.8 °F) (Temporal)   Ht 1.499 m (4' 11\")   Wt 93.4 kg (205 lb 14.4 oz)   SpO2 (!) 89%   BMI 41.59 kg/m²   Depression: Not at risk (9/30/2024)    PHQ-2     PHQ-2 Score: 0     Physical Exam  Vitals reviewed.   Constitutional:       General: She is not in acute distress.     Appearance: Normal appearance.   HENT:      Head: Normocephalic and atraumatic.   Eyes:      General: No scleral icterus.     Extraocular Movements: Extraocular movements intact.      Conjunctiva/sclera: Conjunctivae normal.      Pupils: Pupils are equal, round, and reactive to light.   Pulmonary:      Effort: Pulmonary effort is normal. No respiratory distress.   Musculoskeletal:         General: No swelling, tenderness or deformity.      Cervical back: Normal range of motion and neck supple. No tenderness.      Right lower leg: No edema.      Left lower leg: No edema.      Comments: No synovitis of examined joints   Skin:     Coloration: Skin is not pale.      Findings: No erythema or rash.   Neurological:      General: No focal deficit present.      Mental Status: She is alert and oriented to person, place, and time. Mental status is at baseline.      Gait: Gait normal.   Psychiatric:         Mood and Affect: Mood normal.       Health Maintenance Due   Topic Date Due    Lipid Panel  Never done    Medicare Annual Wellness Visit (AWV)  Never done    Bone Density Scan  Never done    Colorectal Cancer Screening  Never done    Hepatitis C Screening  Never done    Diabetes Screening  Never done    CKD: Urine Protein Screening  " Never done    Zoster Vaccines (1 of 2) Never done    RSV Pregnant patients and/or  patients aged 60+ years (1 - 1-dose 60+ series) Never done    Pneumococcal Vaccine: 65+ Years (2 of 2 - PCV) 02/01/2022    Influenza Vaccine (1) 09/01/2024       Assessment/plan  Assessment & Plan  Undifferentiated connective tissue disease (Multi)  Largely stable without signs of disease progression on evaluation.  Labs ordered today to monitor for increased disease activity, end organ manifestations and to monitor for any drug toxicities due to chronic medications    Orders:    CBC and Auto Differential; Future    Comprehensive Metabolic Panel; Future    C-Reactive Protein; Future    Sedimentation Rate; Future    ARIC with Reflex to MARY; Future    NSAID long-term use         Medication management  Review of CSA done and  to continue to adhere to policy  CSA updated in EMR:             Benign essential hypertension  Uncontrolled.  Advised to see PCP ASAP.  Advised to stop eating salty foods           Follow up: ___3__months    Immunization History   Administered Date(s) Administered    Influenza, Unspecified 10/22/2014    Influenza, seasonal, injectable 10/26/2009, 11/08/2010, 01/27/2012, 10/22/2014    Pneumococcal polysaccharide vaccine, 23-valent, age 2 years and older (PNEUMOVAX 23) 02/01/2021    Tdap vaccine, age 7 year and older (BOOSTRIX, ADACEL) 01/01/2019

## 2024-09-30 NOTE — ASSESSMENT & PLAN NOTE
Largely stable without signs of disease progression on evaluation.  Labs ordered today to monitor for increased disease activity, end organ manifestations and to monitor for any drug toxicities due to chronic medications    Orders:    CBC and Auto Differential; Future    Comprehensive Metabolic Panel; Future    C-Reactive Protein; Future    Sedimentation Rate; Future    ARIC with Reflex to MARY; Future

## 2024-09-30 NOTE — LETTER
September 30, 2024     Maryann Sewell DO  [address] [address]    Patient: Randee Munoz   YOB: 1955   Date of Visit: 9/30/2024       Dear Dr. Maryann Sewell DO:    Thank you for referring Randee Munoz to me for evaluation. Below are my notes for this visit.    If you have questions, please do not hesitate to call me. I look forward to following your patient along with you.       Sincerely,     Jesusita Gusman MD      CC: No Recipients  ______________________________________________________________________________________                                                    [facility] RHEUMATOLOGY     AND INTERNAL MEDICINE    RHEUMATOLOGY PROGRESS NOTE  Randee Munoz 69 y.o. female  Chief Complaint   Patient presents with   • UCTD       SUBJECTIVE  Pt reports arthritis isn't too bad lately and feels better.  BP noted to be high.   Hasn't seen PCP in awhile.  Admits to eating a lot of salt daily; is taking her medication    OARRS:  Jesusita Gusman MD on 9/30/2024 10:05 AM  I have personally reviewed the OARRS report for Randee Munoz. I have considered the risks of abuse, dependence, addiction and diversion and I believe that it is clinically appropriate for Randee Munoz to be prescribed this medication    Is the patient prescribed a combination of a benzodiazepine and opioid?  No    Last Urine Drug Screen / ordered today: Yes  Recent Results (from the past 8760 hour(s))   Confirmation Opiate/Opioid/Benzo Prescription Compliance    Collection Time: 06/24/24  8:33 AM   Result Value Ref Range    Clonazepam <25 <25 ng/mL    7-Aminoclonazepam <25 <25 ng/mL    Alprazolam <25 <25 ng/mL    Alpha-Hydroxyalprazolam <25 <25 ng/mL    Midazolam <25 <25 ng/mL    Alpha-Hydroxymidazolam <25 <25 ng/mL    Chlordiazepoxide <25 <25 ng/mL    Diazepam <25 <25 ng/mL    Nordiazepam <25 <25 ng/mL    Temazepam <25 <25 ng/mL    Oxazepam <25 <25 ng/mL     Lorazepam <25 <25 ng/mL    Methadone <25 <25 ng/mL    EDDP <25 <25 ng/mL    6-Acetylmorphine <25 <25 ng/mL    Codeine <50 <50 ng/mL    Hydrocodone 1,636 (H) <25 ng/mL    Hydromorphone 867 (H) <25 ng/mL    Morphine  <50 <50 ng/mL    Norhydrocodone >1,000 (H) <25 ng/mL    Noroxycodone <25 <25 ng/mL    Oxycodone <25 <25 ng/mL    Oxymorphone <25 <25 ng/mL    Fentanyl <2.5 <2.5 ng/mL    Norfentanyl <2.5 <2.5 ng/mL    Tramadol <50 <50 ng/mL    O-Desmethyltramadol <50 <50 ng/mL    Zolpidem <25 <25 ng/mL    Zolpidem Metabolite (ZCA) <25 <25 ng/mL   Screen Opiate/Opioid/Benzo Prescription Compliance    Collection Time: 06/24/24  8:33 AM   Result Value Ref Range    Creatinine, Urine Random 126.5 20.0 - 320.0 mg/dL    Amphetamine Screen, Urine Presumptive Negative Presumptive Negative    Barbiturate Screen, Urine Presumptive Negative Presumptive Negative    Cannabinoid Screen, Urine Presumptive Negative Presumptive Negative    Cocaine Metabolite Screen, Urine Presumptive Negative Presumptive Negative    PCP Screen, Urine Presumptive Negative Presumptive Negative     Results are as expected.         Controlled Substance Agreement:  Date of the Last Agreement: 10/23  Reviewed Controlled Substance Agreement including but not limited to the benefits, risks, and alternatives to treatment with a Controlled Substance medication(s).    Opioids:  What is the patient's goal of therapy? Pain relief  Is this being achieved with current treatment? yes    I have calculated the patient's Morphine Dose Equivalent (MED):   I have considered referral to Pain Management and/or a specialist, and do not feel it is necessary at this time.    I feel that it is clinically indicated to continue this current medication regimen after consideration of alternative therapies, and other non-opioid treatment.    Opioid Risk Screening:  no    Pain Assessment:  Controlled substance questionnaire    On scale of 0-10  -pain on average over the last week? 6    -pain at its worst over the last week?7  %pain relieved by meds? 100  Amount of pain relief with meds make a difference? yes  MD agrees with efficacy of med? yes    Better/same/worse  Physical functioning same  Family relationships same  Social relationships same  Mood same  Sleep pattern same  Overall functioning same  Side effects? no    Records since last seen reviewed in [facility], [facility] and [facility] Record  Patient Active Problem List    Diagnosis Date Noted   • NSAID long-term use 06/22/2024   • Primary osteoarthritis of left knee 04/03/2024   • Medication management 10/03/2023   • Benign essential hypertension 08/23/2023   • Stage 3a chronic kidney disease (Multi) 08/23/2023   • Undifferentiated connective tissue disease (Multi) 08/23/2023   • Class 3 severe obesity due to excess calories with serious comorbidity and body mass index (BMI) of 40.0 to 44.9 in adult (Multi) 08/23/2023   • Eczema 08/23/2023     Past Medical History:   Diagnosis Date   • COVID-19 12/29/2021    COVID-19 virus infection   • COVID-19 vaccine series declined    • Osteoarthritis of hip 08/23/2023   • Rosacea    • Trochanteric bursitis, left hip 10/16/2017    Trochanteric bursitis of left hip   • Unspecified fracture of lower end of unspecified humerus, initial encounter for closed fracture     Elbow fracture     Current Outpatient Medications on File Prior to Visit   Medication Sig Dispense Refill   • cholecalciferol (Vitamin D-3) 1,250 mcg (50,000 unit) capsule Take 1 capsule (50,000 Units) by mouth 1 (one) time per week.     • etodolac (Lodine) 400 mg tablet Take 1 tablet (400 mg) by mouth 2 times a day as needed (pain). 180 tablet 3   • hydroCHLOROthiazide (HYDRODiuril) 25 mg tablet Take 1 tablet (25 mg) by mouth once daily. 90 tablet 3   • HYDROcodone-acetaminophen (Norco) 7.5-325 mg tablet Take 1 tablet by mouth every 6 hours if needed for moderate pain (4 - 6). pain 120 tablet 0   • triamcinolone (Kenalog) 0.1 % cream  "Apply 1 Application topically 2 times a day. 2 or 3 times daily 45 g 1     No current facility-administered medications on file prior to visit.     Allergies   Allergen Reactions   • Sulfa (Sulfonamide Antibiotics) Hives   • Aspirin Other     sensitive   • Opioids - Morphine Analogues Nausea/vomiting   • Clindamycin Hives and Rash     Social History     Tobacco Use   • Smoking status: Never   • Smokeless tobacco: Never   Substance Use Topics   • Alcohol use: Not Currently   • Drug use: Never     Review of Systems   Constitutional:  Negative for fatigue and fever.   Respiratory:  Negative for cough and shortness of breath.    Cardiovascular:  Negative for chest pain, palpitations and leg swelling.   Musculoskeletal:  Positive for arthralgias and myalgias. Negative for back pain, gait problem and joint swelling.   Skin:  Negative for color change and rash.   Neurological:  Negative for weakness and numbness.       PHYSICAL EXAM  BP (!) 181/86 (BP Location: Left arm, Patient Position: Sitting, BP Cuff Size: Adult)   Pulse 67   Temp 36 °C (96.8 °F) (Temporal)   Ht 1.499 m (4' 11\")   Wt 93.4 kg (205 lb 14.4 oz)   SpO2 (!) 89%   BMI 41.59 kg/m²   Depression: Not at risk (9/30/2024)    PHQ-2    • PHQ-2 Score: 0     Physical Exam  Vitals reviewed.   Constitutional:       General: She is not in acute distress.     Appearance: Normal appearance.   HENT:      Head: Normocephalic and atraumatic.   Eyes:      General: No scleral icterus.     Extraocular Movements: Extraocular movements intact.      Conjunctiva/sclera: Conjunctivae normal.      Pupils: Pupils are equal, round, and reactive to light.   Pulmonary:      Effort: Pulmonary effort is normal. No respiratory distress.   Musculoskeletal:         General: No swelling, tenderness or deformity.      Cervical back: Normal range of motion and neck supple. No tenderness.      Right lower leg: No edema.      Left lower leg: No edema.      Comments: No synovitis of examined " joints   Skin:     Coloration: Skin is not pale.      Findings: No erythema or rash.   Neurological:      General: No focal deficit present.      Mental Status: She is alert and oriented to person, place, and time. Mental status is at baseline.      Gait: Gait normal.   Psychiatric:         Mood and Affect: Mood normal.       Health Maintenance Due   Topic Date Due   • Lipid Panel  Never done   • Medicare Annual Wellness Visit (AWV)  Never done   • Bone Density Scan  Never done   • Colorectal Cancer Screening  Never done   • Hepatitis C Screening  Never done   • Diabetes Screening  Never done   • CKD: Urine Protein Screening  Never done   • Zoster Vaccines (1 of 2) Never done   • RSV Pregnant patients and/or  patients aged 60+ years (1 - 1-dose 60+ series) Never done   • Pneumococcal Vaccine: 65+ Years (2 of 2 - PCV) 02/01/2022   • Influenza Vaccine (1) 09/01/2024       Assessment/plan  Assessment & Plan  Undifferentiated connective tissue disease (Multi)  Largely stable without signs of disease progression on evaluation.  Labs ordered today to monitor for increased disease activity, end organ manifestations and to monitor for any drug toxicities due to chronic medications    Orders:  •  CBC and Auto Differential; Future  •  Comprehensive Metabolic Panel; Future  •  C-Reactive Protein; Future  •  Sedimentation Rate; Future  •  ARIC with Reflex to MARY; Future    NSAID long-term use         Medication management  Review of CSA done and  to continue to adhere to policy  CSA updated in EMR:             Benign essential hypertension  Uncontrolled.  Advised to see PCP ASAP.  Advised to stop eating salty foods           Follow up: ___3__months    Immunization History   Administered Date(s) Administered   • Influenza, Unspecified 10/22/2014   • Influenza, seasonal, injectable 10/26/2009, 11/08/2010, 01/27/2012, 10/22/2014   • Pneumococcal polysaccharide vaccine, 23-valent, age 2 years and older (PNEUMOVAX 23) 02/01/2021   •  Tdap vaccine, age 7 year and older (BOOSTRIX, ADACEL) 01/01/2019

## 2024-10-02 ENCOUNTER — LAB (OUTPATIENT)
Dept: LAB | Facility: LAB | Age: 69
End: 2024-10-02
Payer: COMMERCIAL

## 2024-10-02 DIAGNOSIS — M35.9 UNDIFFERENTIATED CONNECTIVE TISSUE DISEASE (MULTI): ICD-10-CM

## 2024-10-02 LAB
ALBUMIN SERPL BCP-MCNC: 4.3 G/DL (ref 3.4–5)
ALP SERPL-CCNC: 104 U/L (ref 33–136)
ALT SERPL W P-5'-P-CCNC: 14 U/L (ref 7–45)
ANION GAP SERPL CALC-SCNC: 11 MMOL/L (ref 10–20)
AST SERPL W P-5'-P-CCNC: 14 U/L (ref 9–39)
BASOPHILS # BLD AUTO: 0.03 X10*3/UL (ref 0–0.1)
BASOPHILS NFR BLD AUTO: 0.3 %
BILIRUB SERPL-MCNC: 0.4 MG/DL (ref 0–1.2)
BUN SERPL-MCNC: 31 MG/DL (ref 6–23)
CALCIUM SERPL-MCNC: 9.2 MG/DL (ref 8.6–10.3)
CHLORIDE SERPL-SCNC: 101 MMOL/L (ref 98–107)
CO2 SERPL-SCNC: 30 MMOL/L (ref 21–32)
CREAT SERPL-MCNC: 1.26 MG/DL (ref 0.5–1.05)
CRP SERPL-MCNC: 0.97 MG/DL
EGFRCR SERPLBLD CKD-EPI 2021: 46 ML/MIN/1.73M*2
EOSINOPHIL # BLD AUTO: 0.09 X10*3/UL (ref 0–0.4)
EOSINOPHIL NFR BLD AUTO: 0.9 %
ERYTHROCYTE [DISTWIDTH] IN BLOOD BY AUTOMATED COUNT: 13.1 % (ref 11.5–14.5)
ERYTHROCYTE [SEDIMENTATION RATE] IN BLOOD BY WESTERGREN METHOD: 30 MM/H (ref 0–30)
GLUCOSE SERPL-MCNC: 122 MG/DL (ref 74–99)
HCT VFR BLD AUTO: 36.2 % (ref 36–46)
HGB BLD-MCNC: 11.6 G/DL (ref 12–16)
IMM GRANULOCYTES # BLD AUTO: 0.04 X10*3/UL (ref 0–0.5)
IMM GRANULOCYTES NFR BLD AUTO: 0.4 % (ref 0–0.9)
LYMPHOCYTES # BLD AUTO: 1.43 X10*3/UL (ref 0.8–3)
LYMPHOCYTES NFR BLD AUTO: 14.8 %
MCH RBC QN AUTO: 30.3 PG (ref 26–34)
MCHC RBC AUTO-ENTMCNC: 32 G/DL (ref 32–36)
MCV RBC AUTO: 95 FL (ref 80–100)
MONOCYTES # BLD AUTO: 0.78 X10*3/UL (ref 0.05–0.8)
MONOCYTES NFR BLD AUTO: 8.1 %
NEUTROPHILS # BLD AUTO: 7.28 X10*3/UL (ref 1.6–5.5)
NEUTROPHILS NFR BLD AUTO: 75.5 %
NRBC BLD-RTO: 0 /100 WBCS (ref 0–0)
PLATELET # BLD AUTO: 232 X10*3/UL (ref 150–450)
POTASSIUM SERPL-SCNC: 4.1 MMOL/L (ref 3.5–5.3)
PROT SERPL-MCNC: 7 G/DL (ref 6.4–8.2)
RBC # BLD AUTO: 3.83 X10*6/UL (ref 4–5.2)
SODIUM SERPL-SCNC: 138 MMOL/L (ref 136–145)
WBC # BLD AUTO: 9.7 X10*3/UL (ref 4.4–11.3)

## 2024-10-02 PROCEDURE — 80053 COMPREHEN METABOLIC PANEL: CPT

## 2024-10-02 PROCEDURE — 86140 C-REACTIVE PROTEIN: CPT

## 2024-10-02 PROCEDURE — 85652 RBC SED RATE AUTOMATED: CPT

## 2024-10-02 PROCEDURE — 86225 DNA ANTIBODY NATIVE: CPT

## 2024-10-02 PROCEDURE — 86235 NUCLEAR ANTIGEN ANTIBODY: CPT

## 2024-10-02 PROCEDURE — 86038 ANTINUCLEAR ANTIBODIES: CPT

## 2024-10-02 PROCEDURE — 36415 COLL VENOUS BLD VENIPUNCTURE: CPT

## 2024-10-02 PROCEDURE — 85025 COMPLETE CBC W/AUTO DIFF WBC: CPT

## 2024-10-03 LAB
ANA PATTERN: ABNORMAL
ANA SER QL HEP2 SUBST: POSITIVE
ANA TITR SER IF: ABNORMAL {TITER}
CENTROMERE B AB SER-ACNC: <0.2 AI
CHROMATIN AB SERPL-ACNC: <0.2 AI
DSDNA AB SER-ACNC: 20 IU/ML
ENA JO1 AB SER QL IA: <0.2 AI
ENA RNP AB SER IA-ACNC: 0.4 AI
ENA SCL70 AB SER QL IA: <0.2 AI
ENA SM AB SER IA-ACNC: <0.2 AI
ENA SM+RNP AB SER QL IA: <0.2 AI
ENA SS-A AB SER IA-ACNC: <0.2 AI
ENA SS-B AB SER IA-ACNC: <0.2 AI
RIBOSOMAL P AB SER-ACNC: <0.2 AI

## 2024-10-16 ENCOUNTER — TELEPHONE (OUTPATIENT)
Dept: RHEUMATOLOGY | Facility: CLINIC | Age: 69
End: 2024-10-16
Payer: COMMERCIAL

## 2024-10-16 DIAGNOSIS — M35.9 UNDIFFERENTIATED CONNECTIVE TISSUE DISEASE (MULTI): ICD-10-CM

## 2024-10-16 RX ORDER — HYDROCODONE BITARTRATE AND ACETAMINOPHEN 7.5; 325 MG/1; MG/1
1 TABLET ORAL EVERY 6 HOURS PRN
Qty: 120 TABLET | Refills: 0 | Status: SHIPPED | OUTPATIENT
Start: 2024-10-16 | End: 2025-10-16

## 2024-10-16 NOTE — TELEPHONE ENCOUNTER
RX Refill- Norco 7.5 mg - 325 mg      [id number] - [place], [place] - [address] [place], [place] [id number]  Phone: [phone] Fax: [phone]

## 2024-11-18 ENCOUNTER — TELEPHONE (OUTPATIENT)
Dept: RHEUMATOLOGY | Facility: CLINIC | Age: 69
End: 2024-11-18
Payer: COMMERCIAL

## 2024-11-18 DIAGNOSIS — M35.9 UNDIFFERENTIATED CONNECTIVE TISSUE DISEASE (MULTI): ICD-10-CM

## 2024-11-18 RX ORDER — HYDROCODONE BITARTRATE AND ACETAMINOPHEN 7.5; 325 MG/1; MG/1
1 TABLET ORAL EVERY 6 HOURS PRN
Qty: 120 TABLET | Refills: 0 | Status: SHIPPED | OUTPATIENT
Start: 2024-11-18 | End: 2025-11-18

## 2024-11-18 NOTE — TELEPHONE ENCOUNTER
Patient left a message requesting a refill on HYDROcodone-acetaminophen (Norco) 7.5-325 mg tablet  sent to her verified pharmacy, [facility] in [place].

## 2024-12-11 ENCOUNTER — CLINICAL SUPPORT (OUTPATIENT)
Dept: PRIMARY CARE | Facility: CLINIC | Age: 69
End: 2024-12-11
Payer: COMMERCIAL

## 2024-12-11 DIAGNOSIS — M35.9 UNDIFFERENTIATED CONNECTIVE TISSUE DISEASE (MULTI): ICD-10-CM

## 2024-12-11 DIAGNOSIS — M32.9 SYSTEMIC LUPUS ERYTHEMATOSUS, UNSPECIFIED SLE TYPE, UNSPECIFIED ORGAN INVOLVEMENT STATUS (MULTI): ICD-10-CM

## 2024-12-11 PROCEDURE — 86235 NUCLEAR ANTIGEN ANTIBODY: CPT

## 2024-12-11 PROCEDURE — 86160 COMPLEMENT ANTIGEN: CPT

## 2024-12-11 PROCEDURE — 86038 ANTINUCLEAR ANTIBODIES: CPT

## 2024-12-11 PROCEDURE — 86225 DNA ANTIBODY NATIVE: CPT

## 2024-12-11 PROCEDURE — 86140 C-REACTIVE PROTEIN: CPT

## 2024-12-11 PROCEDURE — 85025 COMPLETE CBC W/AUTO DIFF WBC: CPT

## 2024-12-11 PROCEDURE — 85652 RBC SED RATE AUTOMATED: CPT

## 2024-12-11 PROCEDURE — 36415 COLL VENOUS BLD VENIPUNCTURE: CPT

## 2024-12-11 PROCEDURE — 80053 COMPREHEN METABOLIC PANEL: CPT

## 2024-12-12 LAB
ALBUMIN SERPL BCP-MCNC: 4.3 G/DL (ref 3.4–5)
ALP SERPL-CCNC: 93 U/L (ref 33–136)
ALT SERPL W P-5'-P-CCNC: 13 U/L (ref 7–45)
ANION GAP SERPL CALC-SCNC: 15 MMOL/L (ref 10–20)
AST SERPL W P-5'-P-CCNC: 15 U/L (ref 9–39)
BASOPHILS # BLD AUTO: 0.05 X10*3/UL (ref 0–0.1)
BASOPHILS NFR BLD AUTO: 0.6 %
BILIRUB SERPL-MCNC: 0.7 MG/DL (ref 0–1.2)
BUN SERPL-MCNC: 30 MG/DL (ref 6–23)
C3 SERPL-MCNC: 157 MG/DL (ref 87–200)
C4 SERPL-MCNC: 36 MG/DL (ref 10–50)
CALCIUM SERPL-MCNC: 9.3 MG/DL (ref 8.6–10.6)
CHLORIDE SERPL-SCNC: 101 MMOL/L (ref 98–107)
CO2 SERPL-SCNC: 29 MMOL/L (ref 21–32)
CREAT SERPL-MCNC: 1.44 MG/DL (ref 0.5–1.05)
CRP SERPL-MCNC: 0.88 MG/DL
DSDNA AB SER-ACNC: 15 IU/ML
EGFRCR SERPLBLD CKD-EPI 2021: 39 ML/MIN/1.73M*2
EOSINOPHIL # BLD AUTO: 0.09 X10*3/UL (ref 0–0.4)
EOSINOPHIL NFR BLD AUTO: 1.1 %
ERYTHROCYTE [DISTWIDTH] IN BLOOD BY AUTOMATED COUNT: 12.6 % (ref 11.5–14.5)
ERYTHROCYTE [SEDIMENTATION RATE] IN BLOOD BY WESTERGREN METHOD: 23 MM/H (ref 0–30)
GLUCOSE SERPL-MCNC: 78 MG/DL (ref 74–99)
HCT VFR BLD AUTO: 37.8 % (ref 36–46)
HGB BLD-MCNC: 11.6 G/DL (ref 12–16)
IMM GRANULOCYTES # BLD AUTO: 0.05 X10*3/UL (ref 0–0.5)
IMM GRANULOCYTES NFR BLD AUTO: 0.6 % (ref 0–0.9)
LYMPHOCYTES # BLD AUTO: 1.13 X10*3/UL (ref 0.8–3)
LYMPHOCYTES NFR BLD AUTO: 13.7 %
MCH RBC QN AUTO: 30.4 PG (ref 26–34)
MCHC RBC AUTO-ENTMCNC: 30.7 G/DL (ref 32–36)
MCV RBC AUTO: 99 FL (ref 80–100)
MONOCYTES # BLD AUTO: 0.65 X10*3/UL (ref 0.05–0.8)
MONOCYTES NFR BLD AUTO: 7.9 %
NEUTROPHILS # BLD AUTO: 6.29 X10*3/UL (ref 1.6–5.5)
NEUTROPHILS NFR BLD AUTO: 76.1 %
NRBC BLD-RTO: 0 /100 WBCS (ref 0–0)
PLATELET # BLD AUTO: 249 X10*3/UL (ref 150–450)
POTASSIUM SERPL-SCNC: 4.4 MMOL/L (ref 3.5–5.3)
PROT SERPL-MCNC: 7.3 G/DL (ref 6.4–8.2)
RBC # BLD AUTO: 3.81 X10*6/UL (ref 4–5.2)
SODIUM SERPL-SCNC: 141 MMOL/L (ref 136–145)
WBC # BLD AUTO: 8.3 X10*3/UL (ref 4.4–11.3)

## 2024-12-13 LAB
ANA PATTERN: ABNORMAL
ANA SER QL HEP2 SUBST: POSITIVE
ANA TITR SER IF: ABNORMAL {TITER}
CENTROMERE B AB SER-ACNC: <0.2 AI
CHROMATIN AB SERPL-ACNC: <0.2 AI
DSDNA AB SER-ACNC: 14 IU/ML
ENA JO1 AB SER QL IA: <0.2 AI
ENA RNP AB SER IA-ACNC: 0.4 AI
ENA SCL70 AB SER QL IA: <0.2 AI
ENA SM AB SER IA-ACNC: <0.2 AI
ENA SM+RNP AB SER QL IA: <0.2 AI
ENA SS-A AB SER IA-ACNC: <0.2 AI
ENA SS-B AB SER IA-ACNC: <0.2 AI
RIBOSOMAL P AB SER-ACNC: <0.2 AI

## 2024-12-15 RX ORDER — VIT C/E/ZN/COPPR/LUTEIN/ZEAXAN 250MG-90MG
1 CAPSULE ORAL
COMMUNITY

## 2024-12-15 RX ORDER — DILTIAZEM HYDROCHLORIDE 180 MG/1
180 CAPSULE, COATED, EXTENDED RELEASE ORAL
COMMUNITY
Start: 2024-12-03

## 2024-12-18 ENCOUNTER — APPOINTMENT (OUTPATIENT)
Dept: RHEUMATOLOGY | Facility: CLINIC | Age: 69
End: 2024-12-18
Payer: MEDICARE

## 2024-12-18 VITALS
WEIGHT: 207.5 LBS | SYSTOLIC BLOOD PRESSURE: 165 MMHG | TEMPERATURE: 98.1 F | DIASTOLIC BLOOD PRESSURE: 72 MMHG | HEIGHT: 59 IN | BODY MASS INDEX: 41.83 KG/M2 | OXYGEN SATURATION: 97 % | HEART RATE: 71 BPM

## 2024-12-18 DIAGNOSIS — M35.9 UNDIFFERENTIATED CONNECTIVE TISSUE DISEASE (MULTI): Primary | ICD-10-CM

## 2024-12-18 DIAGNOSIS — I10 BENIGN ESSENTIAL HYPERTENSION: ICD-10-CM

## 2024-12-18 DIAGNOSIS — L30.8 OTHER ECZEMA: ICD-10-CM

## 2024-12-18 DIAGNOSIS — Z79.899 MEDICATION MANAGEMENT: ICD-10-CM

## 2024-12-18 DIAGNOSIS — Z79.1 NSAID LONG-TERM USE: ICD-10-CM

## 2024-12-18 PROCEDURE — 3077F SYST BP >= 140 MM HG: CPT | Performed by: INTERNAL MEDICINE

## 2024-12-18 PROCEDURE — 3078F DIAST BP <80 MM HG: CPT | Performed by: INTERNAL MEDICINE

## 2024-12-18 PROCEDURE — 1159F MED LIST DOCD IN RCRD: CPT | Performed by: INTERNAL MEDICINE

## 2024-12-18 PROCEDURE — 3008F BODY MASS INDEX DOCD: CPT | Performed by: INTERNAL MEDICINE

## 2024-12-18 PROCEDURE — 99214 OFFICE O/P EST MOD 30 MIN: CPT | Performed by: INTERNAL MEDICINE

## 2024-12-18 PROCEDURE — 1123F ACP DISCUSS/DSCN MKR DOCD: CPT | Performed by: INTERNAL MEDICINE

## 2024-12-18 PROCEDURE — 1036F TOBACCO NON-USER: CPT | Performed by: INTERNAL MEDICINE

## 2024-12-18 PROCEDURE — 1160F RVW MEDS BY RX/DR IN RCRD: CPT | Performed by: INTERNAL MEDICINE

## 2024-12-18 RX ORDER — TRIAMCINOLONE ACETONIDE 1 MG/G
1 CREAM TOPICAL 2 TIMES DAILY
Qty: 45 G | Refills: 1 | Status: SHIPPED | OUTPATIENT
Start: 2024-12-18 | End: 2025-12-18

## 2024-12-18 RX ORDER — HYDROCODONE BITARTRATE AND ACETAMINOPHEN 7.5; 325 MG/1; MG/1
1 TABLET ORAL EVERY 6 HOURS PRN
Qty: 120 TABLET | Refills: 0 | Status: SHIPPED | OUTPATIENT
Start: 2024-12-18 | End: 2025-12-18

## 2024-12-18 ASSESSMENT — ENCOUNTER SYMPTOMS
BACK PAIN: 0
FATIGUE: 1
MYALGIAS: 0
COUGH: 0
WEAKNESS: 0
FEVER: 0
SHORTNESS OF BREATH: 0
COLOR CHANGE: 0
NUMBNESS: 0
ARTHRALGIAS: 1
JOINT SWELLING: 1

## 2024-12-18 NOTE — ASSESSMENT & PLAN NOTE
Continue topical agent  Orders:    triamcinolone (Kenalog) 0.1 % cream; Apply 1 Application topically 2 times a day. 2 or 3 times daily

## 2024-12-18 NOTE — LETTER
2024     Maryann Sewell DO  [address] [facility] [address]    Patient: Randee Munoz   YOB: 1955   Date of Visit: 2024       Dear Dr. Maryann Sewell DO:    Thank you for referring Randee Munoz to me for evaluation. Below are my notes for this visit  If you have questions, please do not hesitate to call me. I look forward to following your patient along with you.       Sincerely,     Jesusita Gusman MD      CC: No Recipients  ______________________________________________________________________________________                                                    [facility] RHEUMATOLOGY     AND INTERNAL MEDICINE    RHEUMATOLOGY PROGRESS NOTE    Randee Munoz 69 y.o. female  :  1955  PCP:  Maryann Sewell DO    Chief Complaint   Patient presents with   • UCTD       SUBJECTIVE  Pt reports she is stable.  Noting more pain and stiffness lately  Still trying to get her BP in control and notes more fatigue.  Discussed that BP med could be a source of the fatigue and she will discuss with PCP later today  No new areas of pain..  Rash has returned on hands and requests refill of cream    OARRS:  Jesusita Gusman MD on 2024  9:54 AM  I have personally reviewed the OARRS report for Randee Munoz. I have considered the risks of abuse, dependence, addiction and diversion and I believe that it is clinically appropriate for Randee Munoz to be prescribed this medication    Is the patient prescribed a combination of a benzodiazepine and opioid?  No    Last Urine Drug Screen / ordered today: Yes  Recent Results (from the past 8760 hours)   Confirmation Opiate/Opioid/Benzo Prescription Compliance    Collection Time: 24  8:33 AM   Result Value Ref Range    Clonazepam <25 <25 ng/mL    7-Aminoclonazepam <25 <25 ng/mL    Alprazolam <25 <25 ng/mL    Alpha-Hydroxyalprazolam <25 <25 ng/mL    Midazolam <25 <25 ng/mL     Alpha-Hydroxymidazolam <25 <25 ng/mL    Chlordiazepoxide <25 <25 ng/mL    Diazepam <25 <25 ng/mL    Nordiazepam <25 <25 ng/mL    Temazepam <25 <25 ng/mL    Oxazepam <25 <25 ng/mL    Lorazepam <25 <25 ng/mL    Methadone <25 <25 ng/mL    EDDP <25 <25 ng/mL    6-Acetylmorphine <25 <25 ng/mL    Codeine <50 <50 ng/mL    Hydrocodone 1,636 (H) <25 ng/mL    Hydromorphone 867 (H) <25 ng/mL    Morphine  <50 <50 ng/mL    Norhydrocodone >1,000 (H) <25 ng/mL    Noroxycodone <25 <25 ng/mL    Oxycodone <25 <25 ng/mL    Oxymorphone <25 <25 ng/mL    Fentanyl <2.5 <2.5 ng/mL    Norfentanyl <2.5 <2.5 ng/mL    Tramadol <50 <50 ng/mL    O-Desmethyltramadol <50 <50 ng/mL    Zolpidem <25 <25 ng/mL    Zolpidem Metabolite (ZCA) <25 <25 ng/mL   Screen Opiate/Opioid/Benzo Prescription Compliance    Collection Time: 06/24/24  8:33 AM   Result Value Ref Range    Creatinine, Urine Random 126.5 20.0 - 320.0 mg/dL    Amphetamine Screen, Urine Presumptive Negative Presumptive Negative    Barbiturate Screen, Urine Presumptive Negative Presumptive Negative    Cannabinoid Screen, Urine Presumptive Negative Presumptive Negative    Cocaine Metabolite Screen, Urine Presumptive Negative Presumptive Negative    PCP Screen, Urine Presumptive Negative Presumptive Negative     Results are as expected.         Controlled Substance Agreement:  Date of the Last Agreement: 10/23  Reviewed Controlled Substance Agreement including but not limited to the benefits, risks, and alternatives to treatment with a Controlled Substance medication(s).    Opioids:  What is the patient's goal of therapy? Pain relief  Is this being achieved with current treatment? yes    I have calculated the patient's Morphine Dose Equivalent (MED):   I have considered referral to Pain Management and/or a specialist, and do not feel it is necessary at this time.    I feel that it is clinically indicated to continue this current medication regimen after consideration of alternative therapies,  and other non-opioid treatment.    Opioid Risk Screening:  No change in risk    Pain Assessment:  Controlled substance questionnaire    On scale of 0-10  -pain on average over the last week? 5   -pain at its worst over the last week? 7  %pain relieved by meds? 50  Amount of pain relief with meds make a difference? yes  MD agrees with efficacy of med? yes    Better/same/worse  Physical functioning better  Family relationships better  Social relationships better  Mood better    Sleep pattern better  Overall functioning better  Side effects? no    Records since last seen reviewed in [facility], [facility] and [facility]  Patient Active Problem List    Diagnosis Date Noted   • NSAID long-term use 06/22/2024   • Primary osteoarthritis of left knee 04/03/2024   • Medication management 10/03/2023   • Benign essential hypertension 08/23/2023   • Stage 3a chronic kidney disease (Multi) 08/23/2023   • Undifferentiated connective tissue disease (Multi) 08/23/2023   • Class 3 severe obesity due to excess calories with serious comorbidity and body mass index (BMI) of 40.0 to 44.9 in adult 08/23/2023   • Eczema 08/23/2023     Past Medical History:   Diagnosis Date   • COVID-19 12/29/2021    COVID-19 virus infection   • COVID-19 vaccine series declined    • Osteoarthritis of hip 08/23/2023   • Rosacea    • Trochanteric bursitis, left hip 10/16/2017    Trochanteric bursitis of left hip   • Unspecified fracture of lower end of unspecified humerus, initial encounter for closed fracture     Elbow fracture     Current Outpatient Medications on File Prior to Visit   Medication Sig Dispense Refill   • cholecalciferol (Vitamin D-3) 1,250 mcg (50,000 unit) capsule Take 1 capsule (50,000 Units) by mouth 1 (one) time per week.     • cyanocobalamin, vitamin B-12, 2,500 mcg tablet, sublingual SL tablet Place 1 tablet (2,500 mcg) under the tongue once daily.     • dilTIAZem CD (Cardizem CD) 180 mg 24 hr capsule Take 1 capsule (180 mg)  "by mouth once daily.     • etodolac (Lodine) 400 mg tablet Take 1 tablet (400 mg) by mouth 2 times a day as needed (pain). 180 tablet 3   • hydroCHLOROthiazide (HYDRODiuril) 25 mg tablet Take 1 tablet (25 mg) by mouth once daily. 90 tablet 3   • HYDROcodone-acetaminophen (Norco) 7.5-325 mg tablet Take 1 tablet by mouth every 6 hours if needed for moderate pain (4 - 6). pain 120 tablet 0   • triamcinolone (Kenalog) 0.1 % cream Apply 1 Application topically 2 times a day. 2 or 3 times daily 45 g 1     No current facility-administered medications on file prior to visit.     Allergies   Allergen Reactions   • Sulfa (Sulfonamide Antibiotics) Hives   • Aspirin Other     sensitive   • Opioids - Morphine Analogues Nausea/vomiting   • Clindamycin Hives and Rash     Social History     Tobacco Use   • Smoking status: Never   • Smokeless tobacco: Never   Substance Use Topics   • Alcohol use: Not Currently   • Drug use: Never     Review of Systems   Constitutional:  Positive for fatigue. Negative for fever.   Respiratory:  Negative for cough and shortness of breath.    Cardiovascular:  Negative for leg swelling.   Musculoskeletal:  Positive for arthralgias and joint swelling. Negative for back pain, gait problem and myalgias.   Skin:  Positive for rash. Negative for color change.   Neurological:  Negative for weakness and numbness.       PHYSICAL EXAM  /72   Pulse 71   Temp 36.7 °C (98.1 °F)   Ht 1.499 m (4' 11\")   Wt 94.1 kg (207 lb 8 oz)   SpO2 97%   BMI 41.91 kg/m²   Depression: Not at risk (12/18/2024)    PHQ-2    • PHQ-2 Score: 0     Physical Exam  Vitals reviewed.   Constitutional:       General: She is not in acute distress.     Appearance: Normal appearance.   HENT:      Head: Normocephalic and atraumatic.   Eyes:      General: No scleral icterus.     Extraocular Movements: Extraocular movements intact.      Conjunctiva/sclera: Conjunctivae normal.      Pupils: Pupils are equal, round, and reactive to light. "   Pulmonary:      Effort: Pulmonary effort is normal. No respiratory distress.   Musculoskeletal:         General: No swelling, tenderness or deformity.      Cervical back: Normal range of motion and neck supple. No tenderness.      Right lower leg: No edema.      Left lower leg: No edema.      Comments: No synovitis of examined joints   Skin:     Coloration: Skin is not pale.      Findings: Rash (faint red plaque on dorsum of hand (L)) present. No erythema.   Neurological:      General: No focal deficit present.      Mental Status: She is alert and oriented to person, place, and time. Mental status is at baseline.      Gait: Gait normal.   Psychiatric:         Mood and Affect: Mood normal.         No visits with results within 1 Week(s) from this visit.   Latest known visit with results is:   Clinical Support on 12/11/2024   Component Date Value Ref Range Status   • ARIC 12/11/2024 Positive (A)  Negative Final    The Antinuclear Antibody (ARIC) test was performed using  indirect immunofluorescence assay with HEp-2 cells slide.   • ARIC Pattern 12/11/2024 Homogeneous   Final   • ARIC Titer 12/11/2024 1:320   Final   • Anti-DNA (DS) 12/11/2024 15.0 (H)  <5.0 IU/mL Final    NEGATIVE: <= 4 IU/ML  EQUIVOCAL: 5- 9 IU/ML  POSITIVE: >=10 IU/ML   • C3 Complement 12/11/2024 157  87 - 200 mg/dL Final   • C4 Complement 12/11/2024 36  10 - 50 mg/dL Final   • WBC 12/11/2024 8.3  4.4 - 11.3 x10*3/uL Final   • nRBC 12/11/2024 0.0  0.0 - 0.0 /100 WBCs Final   • RBC 12/11/2024 3.81 (L)  4.00 - 5.20 x10*6/uL Final   • Hemoglobin 12/11/2024 11.6 (L)  12.0 - 16.0 g/dL Final   • Hematocrit 12/11/2024 37.8  36.0 - 46.0 % Final   • MCV 12/11/2024 99  80 - 100 fL Final   • MCH 12/11/2024 30.4  26.0 - 34.0 pg Final   • MCHC 12/11/2024 30.7 (L)  32.0 - 36.0 g/dL Final   • RDW 12/11/2024 12.6  11.5 - 14.5 % Final   • Platelets 12/11/2024 249  150 - 450 x10*3/uL Final   • Neutrophils % 12/11/2024 76.1  40.0 - 80.0 % Final   • Immature Granulocytes  %, Automated 12/11/2024 0.6  0.0 - 0.9 % Final    Immature Granulocyte Count (IG) includes promyelocytes, myelocytes and metamyelocytes but does not include bands. Percent differential counts (%) should be interpreted in the context of the absolute cell counts (cells/UL).   • Lymphocytes % 12/11/2024 13.7  13.0 - 44.0 % Final   • Monocytes % 12/11/2024 7.9  2.0 - 10.0 % Final   • Eosinophils % 12/11/2024 1.1  0.0 - 6.0 % Final   • Basophils % 12/11/2024 0.6  0.0 - 2.0 % Final   • Neutrophils Absolute 12/11/2024 6.29 (H)  1.60 - 5.50 x10*3/uL Final    Percent differential counts (%) should be interpreted in the context of the absolute cell counts (cells/uL).   • Immature Granulocytes Absolute, Au* 12/11/2024 0.05  0.00 - 0.50 x10*3/uL Final   • Lymphocytes Absolute 12/11/2024 1.13  0.80 - 3.00 x10*3/uL Final   • Monocytes Absolute 12/11/2024 0.65  0.05 - 0.80 x10*3/uL Final   • Eosinophils Absolute 12/11/2024 0.09  0.00 - 0.40 x10*3/uL Final   • Basophils Absolute 12/11/2024 0.05  0.00 - 0.10 x10*3/uL Final   • Glucose 12/11/2024 78  74 - 99 mg/dL Final   • Sodium 12/11/2024 141  136 - 145 mmol/L Final   • Potassium 12/11/2024 4.4  3.5 - 5.3 mmol/L Final   • Chloride 12/11/2024 101  98 - 107 mmol/L Final   • Bicarbonate 12/11/2024 29  21 - 32 mmol/L Final   • Anion Gap 12/11/2024 15  10 - 20 mmol/L Final   • Urea Nitrogen 12/11/2024 30 (H)  6 - 23 mg/dL Final   • Creatinine 12/11/2024 1.44 (H)  0.50 - 1.05 mg/dL Final   • eGFR 12/11/2024 39 (L)  >60 mL/min/1.73m*2 Final    Calculations of estimated GFR are performed using the 2021 CKD-EPI Study Refit equation without the race variable for the IDMS-Traceable creatinine methods.  https://jasn.asnjournals.org/content/early/2021/09/22/ASN.2519659725   • Calcium 12/11/2024 9.3  8.6 - 10.6 mg/dL Final   • Albumin 12/11/2024 4.3  3.4 - 5.0 g/dL Final   • Alkaline Phosphatase 12/11/2024 93  33 - 136 U/L Final   • Total Protein 12/11/2024 7.3  6.4 - 8.2 g/dL Final   • AST  12/11/2024 15  9 - 39 U/L Final   • Bilirubin, Total 12/11/2024 0.7  0.0 - 1.2 mg/dL Final   • ALT 12/11/2024 13  7 - 45 U/L Final    Patients treated with Sulfasalazine may generate falsely decreased results for ALT.   • C-Reactive Protein 12/11/2024 0.88  <1.00 mg/dL Final   • Sedimentation Rate 12/11/2024 23  0 - 30 mm/h Final   • Anti-SM 12/11/2024 <0.2  <1.0 AI Final    < 1.0 = NEGATIVE  >=1.0 = POSITIVE   • Anti-RNP 12/11/2024 0.4  <1.0 AI Final    < 1.0 = NEGATIVE  >=1.0 = POSITIVE   • Anti-SM/RNP 12/11/2024 <0.2  <1.0 AI Final    < 1.0 = NEGATIVE  >=1.0 = POSITIVE   • Anti-SSA 12/11/2024 <0.2  <1.0 AI Final    < 1.0 = NEGATIVE  >=1.0 = POSITIVE   • Anti-SSB 12/11/2024 <0.2  <1.0 AI Final    < 1.0 = NEGATIVE  >=1.0 = POSITIVE   • Anti-SCL-70 12/11/2024 <0.2  <1.0 AI Final    < 1.0 = NEGATIVE  >=1.0 = POSITIVE   • Anti-ZAHRAA-1 IgG 12/11/2024 <0.2  <1.0 AI Final    < 1.0 = NEGATIVE  >=1.0 = POSITIVE   • Anti-Chromatin 12/11/2024 <0.2  <1.0 AI Final    < 1.0 = NEGATIVE  >=1.0 = POSITIVE   • Anti-Centromere 12/11/2024 <0.2  <1.0 AI Final    < 1.0 = NEGATIVE  >=1.0 = POSITIVE   • ANTI-RIBOSOMAL P 12/11/2024 <0.2  <1.0 AI Final    < 1.0 = NEGATIVE  >=1.0 = POSITIVE   • Anti-DNA (DS) 12/11/2024 14.0 (H)  <5.0 IU/mL Final    NEGATIVE: <= 4 IU/ML  EQUIVOCAL: 5- 9 IU/ML  POSITIVE: >=10 IU/ML       Health Maintenance Due   Topic Date Due   • Lipid Panel  Never done   • Medicare Annual Wellness Visit (AWV)  Never done   • Bone Density Scan  Never done   • Colorectal Cancer Screening  Never done   • Hepatitis C Screening  Never done   • Diabetes Screening  Never done   • CKD: Urine Protein Screening  Never done   • Zoster Vaccines (1 of 2) Never done   • RSV High Risk: (Elderly (60+) or Pregnant Population) (1 - Risk 60-74 years 1-dose series) Never done   • Pneumococcal Vaccine: 65+ Years (2 of 2 - PCV) 02/01/2022   • Influenza Vaccine (1) 09/01/2024       Assessment/plan  Assessment & Plan  Undifferentiated connective  tissue disease (Multi)  On NSAID therapy.  Largely stable.  Continue meds.   No signs of disease progression on exam or by lab  Orders:  •  CBC and Auto Differential; Future  •  Comprehensive Metabolic Panel; Future  •  C-Reactive Protein; Future  •  Sedimentation Rate; Future  •  ARIC with Reflex to MARY; Future  •  triamcinolone (Kenalog) 0.1 % cream; Apply 1 Application topically 2 times a day. 2 or 3 times daily  •  HYDROcodone-acetaminophen (Norco) 7.5-325 mg tablet; Take 1 tablet by mouth every 6 hours if needed for moderate pain (4 - 6). pain    NSAID long-term use         Medication management  Review of CSA done and  to continue to adhere to policy  CSA updated in EMR:           Other eczema  Continue topical agent  Orders:  •  triamcinolone (Kenalog) 0.1 % cream; Apply 1 Application topically 2 times a day. 2 or 3 times daily    Benign essential hypertension  BP high.  Pt to see PCP later today         Follow up: ___3__months    Immunization History   Administered Date(s) Administered   • Influenza, Unspecified 10/22/2014   • Influenza, seasonal, injectable 10/26/2009, 11/08/2010, 01/27/2012, 10/22/2014   • Pneumococcal polysaccharide vaccine, 23-valent, age 2 years and older (PNEUMOVAX 23) 02/01/2021   • Tdap vaccine, age 7 year and older (BOOSTRIX, ADACEL) 01/01/2019

## 2024-12-18 NOTE — PROGRESS NOTES
[facility] RHEUMATOLOGY     AND INTERNAL MEDICINE    RHEUMATOLOGY PROGRESS NOTE    Randee Munoz 69 y.o. female  :  1955  PCP:  Maryann Sewell, DO    Chief Complaint   Patient presents with    UCTD       SUBJECTIVE  Pt reports she is stable.  Noting more pain and stiffness lately  Still trying to get her BP in control and notes more fatigue.  Discussed that BP med could be a source of the fatigue and she will discuss with PCP later today  No new areas of pain..  Rash has returned on hands and requests refill of cream    OARRS:  Jesusita Gusman MD on 2024  9:54 AM  I have personally reviewed the OARRS report for Randee Munoz. I have considered the risks of abuse, dependence, addiction and diversion and I believe that it is clinically appropriate for Randee Munoz to be prescribed this medication    Is the patient prescribed a combination of a benzodiazepine and opioid?  No    Last Urine Drug Screen / ordered today: Yes  Recent Results (from the past 8760 hours)   Confirmation Opiate/Opioid/Benzo Prescription Compliance    Collection Time: 24  8:33 AM   Result Value Ref Range    Clonazepam <25 <25 ng/mL    7-Aminoclonazepam <25 <25 ng/mL    Alprazolam <25 <25 ng/mL    Alpha-Hydroxyalprazolam <25 <25 ng/mL    Midazolam <25 <25 ng/mL    Alpha-Hydroxymidazolam <25 <25 ng/mL    Chlordiazepoxide <25 <25 ng/mL    Diazepam <25 <25 ng/mL    Nordiazepam <25 <25 ng/mL    Temazepam <25 <25 ng/mL    Oxazepam <25 <25 ng/mL    Lorazepam <25 <25 ng/mL    Methadone <25 <25 ng/mL    EDDP <25 <25 ng/mL    6-Acetylmorphine <25 <25 ng/mL    Codeine <50 <50 ng/mL    Hydrocodone 1,636 (H) <25 ng/mL    Hydromorphone 867 (H) <25 ng/mL    Morphine  <50 <50 ng/mL    Norhydrocodone >1,000 (H) <25 ng/mL    Noroxycodone <25 <25 ng/mL    Oxycodone <25 <25 ng/mL    Oxymorphone <25 <25 ng/mL    Fentanyl <2.5 <2.5 ng/mL    Norfentanyl <2.5 <2.5 ng/mL     Tramadol <50 <50 ng/mL    O-Desmethyltramadol <50 <50 ng/mL    Zolpidem <25 <25 ng/mL    Zolpidem Metabolite (ZCA) <25 <25 ng/mL   Screen Opiate/Opioid/Benzo Prescription Compliance    Collection Time: 06/24/24  8:33 AM   Result Value Ref Range    Creatinine, Urine Random 126.5 20.0 - 320.0 mg/dL    Amphetamine Screen, Urine Presumptive Negative Presumptive Negative    Barbiturate Screen, Urine Presumptive Negative Presumptive Negative    Cannabinoid Screen, Urine Presumptive Negative Presumptive Negative    Cocaine Metabolite Screen, Urine Presumptive Negative Presumptive Negative    PCP Screen, Urine Presumptive Negative Presumptive Negative     Results are as expected.         Controlled Substance Agreement:  Date of the Last Agreement: 10/23  Reviewed Controlled Substance Agreement including but not limited to the benefits, risks, and alternatives to treatment with a Controlled Substance medication(s).    Opioids:  What is the patient's goal of therapy? Pain relief  Is this being achieved with current treatment? yes    I have calculated the patient's Morphine Dose Equivalent (MED):   I have considered referral to Pain Management and/or a specialist, and do not feel it is necessary at this time.    I feel that it is clinically indicated to continue this current medication regimen after consideration of alternative therapies, and other non-opioid treatment.    Opioid Risk Screening:  No change in risk    Pain Assessment:  Controlled substance questionnaire    On scale of 0-10  -pain on average over the last week? 5   -pain at its worst over the last week? 7  %pain relieved by meds? 50  Amount of pain relief with meds make a difference? yes  MD agrees with efficacy of med? yes    Better/same/worse  Physical functioning better  Family relationships better  Social relationships better  Mood better    Sleep pattern better  Overall functioning better  Side effects? no    Records since last seen reviewed in Epic,  [facility] Record  Patient Active Problem List    Diagnosis Date Noted    NSAID long-term use 06/22/2024    Primary osteoarthritis of left knee 04/03/2024    Medication management 10/03/2023    Benign essential hypertension 08/23/2023    Stage 3a chronic kidney disease (Multi) 08/23/2023    Undifferentiated connective tissue disease (Multi) 08/23/2023    Class 3 severe obesity due to excess calories with serious comorbidity and body mass index (BMI) of 40.0 to 44.9 in adult 08/23/2023    Eczema 08/23/2023     Past Medical History:   Diagnosis Date    COVID-19 12/29/2021    COVID-19 virus infection    COVID-19 vaccine series declined     Osteoarthritis of hip 08/23/2023    Rosacea     Trochanteric bursitis, left hip 10/16/2017    Trochanteric bursitis of left hip    Unspecified fracture of lower end of unspecified humerus, initial encounter for closed fracture     Elbow fracture     Current Outpatient Medications on File Prior to Visit   Medication Sig Dispense Refill    cholecalciferol (Vitamin D-3) 1,250 mcg (50,000 unit) capsule Take 1 capsule (50,000 Units) by mouth 1 (one) time per week.      cyanocobalamin, vitamin B-12, 2,500 mcg tablet, sublingual SL tablet Place 1 tablet (2,500 mcg) under the tongue once daily.      dilTIAZem CD (Cardizem CD) 180 mg 24 hr capsule Take 1 capsule (180 mg) by mouth once daily.      etodolac (Lodine) 400 mg tablet Take 1 tablet (400 mg) by mouth 2 times a day as needed (pain). 180 tablet 3    hydroCHLOROthiazide (HYDRODiuril) 25 mg tablet Take 1 tablet (25 mg) by mouth once daily. 90 tablet 3    HYDROcodone-acetaminophen (Norco) 7.5-325 mg tablet Take 1 tablet by mouth every 6 hours if needed for moderate pain (4 - 6). pain 120 tablet 0    triamcinolone (Kenalog) 0.1 % cream Apply 1 Application topically 2 times a day. 2 or 3 times daily 45 g 1     No current facility-administered medications on file prior to visit.     Allergies   Allergen Reactions     "Sulfa (Sulfonamide Antibiotics) Hives    Aspirin Other     sensitive    Opioids - Morphine Analogues Nausea/vomiting    Clindamycin Hives and Rash     Social History     Tobacco Use    Smoking status: Never    Smokeless tobacco: Never   Substance Use Topics    Alcohol use: Not Currently    Drug use: Never     Review of Systems   Constitutional:  Positive for fatigue. Negative for fever.   Respiratory:  Negative for cough and shortness of breath.    Cardiovascular:  Negative for leg swelling.   Musculoskeletal:  Positive for arthralgias and joint swelling. Negative for back pain, gait problem and myalgias.   Skin:  Positive for rash. Negative for color change.   Neurological:  Negative for weakness and numbness.       PHYSICAL EXAM  /72   Pulse 71   Temp 36.7 °C (98.1 °F)   Ht 1.499 m (4' 11\")   Wt 94.1 kg (207 lb 8 oz)   SpO2 97%   BMI 41.91 kg/m²   Depression: Not at risk (12/18/2024)    PHQ-2     PHQ-2 Score: 0     Physical Exam  Vitals reviewed.   Constitutional:       General: She is not in acute distress.     Appearance: Normal appearance.   HENT:      Head: Normocephalic and atraumatic.   Eyes:      General: No scleral icterus.     Extraocular Movements: Extraocular movements intact.      Conjunctiva/sclera: Conjunctivae normal.      Pupils: Pupils are equal, round, and reactive to light.   Pulmonary:      Effort: Pulmonary effort is normal. No respiratory distress.   Musculoskeletal:         General: No swelling, tenderness or deformity.      Cervical back: Normal range of motion and neck supple. No tenderness.      Right lower leg: No edema.      Left lower leg: No edema.      Comments: No synovitis of examined joints   Skin:     Coloration: Skin is not pale.      Findings: Rash (faint red plaque on dorsum of hand (L)) present. No erythema.   Neurological:      General: No focal deficit present.      Mental Status: She is alert and oriented to person, place, and time. Mental status is at " baseline.      Gait: Gait normal.   Psychiatric:         Mood and Affect: Mood normal.         No visits with results within 1 Week(s) from this visit.   Latest known visit with results is:   Clinical Support on 12/11/2024   Component Date Value Ref Range Status    ARIC 12/11/2024 Positive (A)  Negative Final    The Antinuclear Antibody (ARIC) test was performed using  indirect immunofluorescence assay with HEp-2 cells slide.    ARIC Pattern 12/11/2024 Homogeneous   Final    ARIC Titer 12/11/2024 1:320   Final    Anti-DNA (DS) 12/11/2024 15.0 (H)  <5.0 IU/mL Final    NEGATIVE: <= 4 IU/ML  EQUIVOCAL: 5- 9 IU/ML  POSITIVE: >=10 IU/ML    C3 Complement 12/11/2024 157  87 - 200 mg/dL Final    C4 Complement 12/11/2024 36  10 - 50 mg/dL Final    WBC 12/11/2024 8.3  4.4 - 11.3 x10*3/uL Final    nRBC 12/11/2024 0.0  0.0 - 0.0 /100 WBCs Final    RBC 12/11/2024 3.81 (L)  4.00 - 5.20 x10*6/uL Final    Hemoglobin 12/11/2024 11.6 (L)  12.0 - 16.0 g/dL Final    Hematocrit 12/11/2024 37.8  36.0 - 46.0 % Final    MCV 12/11/2024 99  80 - 100 fL Final    MCH 12/11/2024 30.4  26.0 - 34.0 pg Final    MCHC 12/11/2024 30.7 (L)  32.0 - 36.0 g/dL Final    RDW 12/11/2024 12.6  11.5 - 14.5 % Final    Platelets 12/11/2024 249  150 - 450 x10*3/uL Final    Neutrophils % 12/11/2024 76.1  40.0 - 80.0 % Final    Immature Granulocytes %, Automated 12/11/2024 0.6  0.0 - 0.9 % Final    Immature Granulocyte Count (IG) includes promyelocytes, myelocytes and metamyelocytes but does not include bands. Percent differential counts (%) should be interpreted in the context of the absolute cell counts (cells/UL).    Lymphocytes % 12/11/2024 13.7  13.0 - 44.0 % Final    Monocytes % 12/11/2024 7.9  2.0 - 10.0 % Final    Eosinophils % 12/11/2024 1.1  0.0 - 6.0 % Final    Basophils % 12/11/2024 0.6  0.0 - 2.0 % Final    Neutrophils Absolute 12/11/2024 6.29 (H)  1.60 - 5.50 x10*3/uL Final    Percent differential counts (%) should be interpreted in the context of the  absolute cell counts (cells/uL).    Immature Granulocytes Absolute, Au* 12/11/2024 0.05  0.00 - 0.50 x10*3/uL Final    Lymphocytes Absolute 12/11/2024 1.13  0.80 - 3.00 x10*3/uL Final    Monocytes Absolute 12/11/2024 0.65  0.05 - 0.80 x10*3/uL Final    Eosinophils Absolute 12/11/2024 0.09  0.00 - 0.40 x10*3/uL Final    Basophils Absolute 12/11/2024 0.05  0.00 - 0.10 x10*3/uL Final    Glucose 12/11/2024 78  74 - 99 mg/dL Final    Sodium 12/11/2024 141  136 - 145 mmol/L Final    Potassium 12/11/2024 4.4  3.5 - 5.3 mmol/L Final    Chloride 12/11/2024 101  98 - 107 mmol/L Final    Bicarbonate 12/11/2024 29  21 - 32 mmol/L Final    Anion Gap 12/11/2024 15  10 - 20 mmol/L Final    Urea Nitrogen 12/11/2024 30 (H)  6 - 23 mg/dL Final    Creatinine 12/11/2024 1.44 (H)  0.50 - 1.05 mg/dL Final    eGFR 12/11/2024 39 (L)  >60 mL/min/1.73m*2 Final    Calculations of estimated GFR are performed using the 2021 CKD-EPI Study Refit equation without the race variable for the IDMS-Traceable creatinine methods.  https://jasn.asnjournals.org/content/early/2021/09/22/ASN.1235086921    Calcium 12/11/2024 9.3  8.6 - 10.6 mg/dL Final    Albumin 12/11/2024 4.3  3.4 - 5.0 g/dL Final    Alkaline Phosphatase 12/11/2024 93  33 - 136 U/L Final    Total Protein 12/11/2024 7.3  6.4 - 8.2 g/dL Final    AST 12/11/2024 15  9 - 39 U/L Final    Bilirubin, Total 12/11/2024 0.7  0.0 - 1.2 mg/dL Final    ALT 12/11/2024 13  7 - 45 U/L Final    Patients treated with Sulfasalazine may generate falsely decreased results for ALT.    C-Reactive Protein 12/11/2024 0.88  <1.00 mg/dL Final    Sedimentation Rate 12/11/2024 23  0 - 30 mm/h Final    Anti-SM 12/11/2024 <0.2  <1.0 AI Final    < 1.0 = NEGATIVE  >=1.0 = POSITIVE    Anti-RNP 12/11/2024 0.4  <1.0 AI Final    < 1.0 = NEGATIVE  >=1.0 = POSITIVE    Anti-SM/RNP 12/11/2024 <0.2  <1.0 AI Final    < 1.0 = NEGATIVE  >=1.0 = POSITIVE    Anti-SSA 12/11/2024 <0.2  <1.0 AI Final    < 1.0 = NEGATIVE  >=1.0 = POSITIVE     Anti-SSB 12/11/2024 <0.2  <1.0 AI Final    < 1.0 = NEGATIVE  >=1.0 = POSITIVE    Anti-SCL-70 12/11/2024 <0.2  <1.0 AI Final    < 1.0 = NEGATIVE  >=1.0 = POSITIVE    Anti-ZAHRAA-1 IgG 12/11/2024 <0.2  <1.0 AI Final    < 1.0 = NEGATIVE  >=1.0 = POSITIVE    Anti-Chromatin 12/11/2024 <0.2  <1.0 AI Final    < 1.0 = NEGATIVE  >=1.0 = POSITIVE    Anti-Centromere 12/11/2024 <0.2  <1.0 AI Final    < 1.0 = NEGATIVE  >=1.0 = POSITIVE    ANTI-RIBOSOMAL P 12/11/2024 <0.2  <1.0 AI Final    < 1.0 = NEGATIVE  >=1.0 = POSITIVE    Anti-DNA (DS) 12/11/2024 14.0 (H)  <5.0 IU/mL Final    NEGATIVE: <= 4 IU/ML  EQUIVOCAL: 5- 9 IU/ML  POSITIVE: >=10 IU/ML       Health Maintenance Due   Topic Date Due    Lipid Panel  Never done    Medicare Annual Wellness Visit (AWV)  Never done    Bone Density Scan  Never done    Colorectal Cancer Screening  Never done    Hepatitis C Screening  Never done    Diabetes Screening  Never done    CKD: Urine Protein Screening  Never done    Zoster Vaccines (1 of 2) Never done    RSV High Risk: (Elderly (60+) or Pregnant Population) (1 - Risk 60-74 years 1-dose series) Never done    Pneumococcal Vaccine: 65+ Years (2 of 2 - PCV) 02/01/2022    Influenza Vaccine (1) 09/01/2024       Assessment/plan  Assessment & Plan  Undifferentiated connective tissue disease (Multi)  On NSAID therapy.  Largely stable.  Continue meds.   No signs of disease progression on exam or by lab  Orders:    CBC and Auto Differential; Future    Comprehensive Metabolic Panel; Future    C-Reactive Protein; Future    Sedimentation Rate; Future    ARIC with Reflex to MARY; Future    triamcinolone (Kenalog) 0.1 % cream; Apply 1 Application topically 2 times a day. 2 or 3 times daily    HYDROcodone-acetaminophen (Norco) 7.5-325 mg tablet; Take 1 tablet by mouth every 6 hours if needed for moderate pain (4 - 6). pain    NSAID long-term use         Medication management  Review of CSA done and  to continue to adhere to policy  CSA updated in EMR:            Other eczema  Continue topical agent  Orders:    triamcinolone (Kenalog) 0.1 % cream; Apply 1 Application topically 2 times a day. 2 or 3 times daily    Benign essential hypertension  BP high.  Pt to see PCP later today         Follow up: ___3__months    Immunization History   Administered Date(s) Administered    Influenza, Unspecified 10/22/2014    Influenza, seasonal, injectable 10/26/2009, 11/08/2010, 01/27/2012, 10/22/2014    Pneumococcal polysaccharide vaccine, 23-valent, age 2 years and older (PNEUMOVAX 23) 02/01/2021    Tdap vaccine, age 7 year and older (BOOSTRIX, ADACEL) 01/01/2019

## 2024-12-18 NOTE — PATIENT INSTRUCTIONS
It was a pleasure to see you today  Please call if your symptoms worsen  Please review your summary for education and reminders.  Follow up at your next appointment.    If you had labs/xrays done today, you will be able to view on [facility].   We will contact you when the results are reviewed for further discussion.  Please note that you may receive your results before I have had a chance to review.  Please know I will be contacting you for discussion  Homegoing instructions for all patient  A healthy lifestyle helps chronic diseases  These are all the goals you should strive to improve your overall health   Blood pressure <130/85   BMI of <30 or waist circumference that is 1/2 of your height   Fasting blood sugar <107 (if you are diabetic, aim for an A1c <6.4%_   LDL cholesterol <130   Avoid smoking   Manage your stress   Get your preventive exams   Get your immunizations

## 2024-12-18 NOTE — ASSESSMENT & PLAN NOTE
On NSAID therapy.  Largely stable.  Continue meds.   No signs of disease progression on exam or by lab  Orders:    CBC and Auto Differential; Future    Comprehensive Metabolic Panel; Future    C-Reactive Protein; Future    Sedimentation Rate; Future    ARIC with Reflex to MARY; Future    triamcinolone (Kenalog) 0.1 % cream; Apply 1 Application topically 2 times a day. 2 or 3 times daily    HYDROcodone-acetaminophen (Norco) 7.5-325 mg tablet; Take 1 tablet by mouth every 6 hours if needed for moderate pain (4 - 6). pain

## 2025-01-24 ENCOUNTER — TELEPHONE (OUTPATIENT)
Dept: PRIMARY CARE | Facility: CLINIC | Age: 70
End: 2025-01-24
Payer: COMMERCIAL

## 2025-01-24 DIAGNOSIS — M35.9 UNDIFFERENTIATED CONNECTIVE TISSUE DISEASE (MULTI): ICD-10-CM

## 2025-01-24 RX ORDER — HYDROCODONE BITARTRATE AND ACETAMINOPHEN 7.5; 325 MG/1; MG/1
1 TABLET ORAL EVERY 6 HOURS PRN
Qty: 120 TABLET | Refills: 0 | Status: SHIPPED | OUTPATIENT
Start: 2025-01-24 | End: 2026-01-24

## 2025-02-20 ENCOUNTER — TELEPHONE (OUTPATIENT)
Dept: RHEUMATOLOGY | Facility: CLINIC | Age: 70
End: 2025-02-20
Payer: COMMERCIAL

## 2025-02-20 DIAGNOSIS — I10 BENIGN ESSENTIAL HYPERTENSION: ICD-10-CM

## 2025-02-20 DIAGNOSIS — M35.9 UNDIFFERENTIATED CONNECTIVE TISSUE DISEASE (MULTI): ICD-10-CM

## 2025-02-20 RX ORDER — HYDROCHLOROTHIAZIDE 25 MG/1
25 TABLET ORAL DAILY
Qty: 90 TABLET | Refills: 3 | Status: SHIPPED | OUTPATIENT
Start: 2025-02-20 | End: 2026-02-20

## 2025-02-20 NOTE — TELEPHONE ENCOUNTER
Patient left a message requesting a refill on hydroCHLOROthiazide (HYDRODiuril) 25 mg tablet sent to her verified pharmacy, [facility] in [place].

## 2025-02-21 ENCOUNTER — TELEPHONE (OUTPATIENT)
Dept: RHEUMATOLOGY | Facility: CLINIC | Age: 70
End: 2025-02-21
Payer: COMMERCIAL

## 2025-02-21 RX ORDER — HYDROCODONE BITARTRATE AND ACETAMINOPHEN 7.5; 325 MG/1; MG/1
1 TABLET ORAL EVERY 6 HOURS PRN
Qty: 120 TABLET | Refills: 0 | Status: SHIPPED | OUTPATIENT
Start: 2025-02-23 | End: 2026-02-23

## 2025-02-26 ENCOUNTER — HOSPITAL ENCOUNTER (EMERGENCY)
Facility: HOSPITAL | Age: 70
Discharge: HOME | End: 2025-02-26
Attending: EMERGENCY MEDICINE
Payer: COMMERCIAL

## 2025-02-26 ENCOUNTER — APPOINTMENT (OUTPATIENT)
Dept: RADIOLOGY | Facility: HOSPITAL | Age: 70
End: 2025-02-26
Payer: COMMERCIAL

## 2025-02-26 VITALS
WEIGHT: 200 LBS | RESPIRATION RATE: 18 BRPM | SYSTOLIC BLOOD PRESSURE: 151 MMHG | DIASTOLIC BLOOD PRESSURE: 68 MMHG | OXYGEN SATURATION: 94 % | HEART RATE: 82 BPM | HEIGHT: 58 IN | BODY MASS INDEX: 41.98 KG/M2 | TEMPERATURE: 99.6 F

## 2025-02-26 DIAGNOSIS — J40 BRONCHITIS: ICD-10-CM

## 2025-02-26 DIAGNOSIS — J02.9 VIRAL PHARYNGITIS: Primary | ICD-10-CM

## 2025-02-26 LAB
FLUAV RNA RESP QL NAA+PROBE: NOT DETECTED
FLUBV RNA RESP QL NAA+PROBE: NOT DETECTED
S PYO DNA THROAT QL NAA+PROBE: NOT DETECTED
SARS-COV-2 RNA RESP QL NAA+PROBE: NOT DETECTED

## 2025-02-26 PROCEDURE — 87651 STREP A DNA AMP PROBE: CPT | Performed by: EMERGENCY MEDICINE

## 2025-02-26 PROCEDURE — 2500000004 HC RX 250 GENERAL PHARMACY W/ HCPCS (ALT 636 FOR OP/ED): Performed by: EMERGENCY MEDICINE

## 2025-02-26 PROCEDURE — 99284 EMERGENCY DEPT VISIT MOD MDM: CPT | Performed by: EMERGENCY MEDICINE

## 2025-02-26 PROCEDURE — 87636 SARSCOV2 & INF A&B AMP PRB: CPT | Performed by: EMERGENCY MEDICINE

## 2025-02-26 PROCEDURE — 71045 X-RAY EXAM CHEST 1 VIEW: CPT

## 2025-02-26 PROCEDURE — 71045 X-RAY EXAM CHEST 1 VIEW: CPT | Mod: FOREIGN READ | Performed by: RADIOLOGY

## 2025-02-26 RX ORDER — ALBUTEROL SULFATE 90 UG/1
2 INHALANT RESPIRATORY (INHALATION) EVERY 6 HOURS PRN
Qty: 1 G | Refills: 0 | Status: SHIPPED | OUTPATIENT
Start: 2025-02-26

## 2025-02-26 RX ORDER — DOXYCYCLINE 100 MG/1
100 TABLET ORAL 2 TIMES DAILY
Qty: 14 TABLET | Refills: 0 | Status: SHIPPED | OUTPATIENT
Start: 2025-02-26 | End: 2025-03-05

## 2025-02-26 RX ORDER — DEXAMETHASONE SODIUM PHOSPHATE 10 MG/ML
10 INJECTION INTRAMUSCULAR; INTRAVENOUS ONCE
Status: COMPLETED | OUTPATIENT
Start: 2025-02-26 | End: 2025-02-26

## 2025-02-26 RX ORDER — ONDANSETRON 4 MG/1
4 TABLET, ORALLY DISINTEGRATING ORAL EVERY 8 HOURS PRN
Qty: 15 TABLET | Refills: 0 | Status: SHIPPED | OUTPATIENT
Start: 2025-02-26

## 2025-02-26 RX ORDER — ONDANSETRON 4 MG/1
4 TABLET, ORALLY DISINTEGRATING ORAL ONCE
Status: COMPLETED | OUTPATIENT
Start: 2025-02-26 | End: 2025-02-26

## 2025-02-26 RX ADMIN — ONDANSETRON 4 MG: 4 TABLET, ORALLY DISINTEGRATING ORAL at 09:33

## 2025-02-26 RX ADMIN — DEXAMETHASONE SODIUM PHOSPHATE 10 MG: 10 INJECTION, SOLUTION INTRAMUSCULAR; INTRAVENOUS at 10:38

## 2025-02-26 ASSESSMENT — COLUMBIA-SUICIDE SEVERITY RATING SCALE - C-SSRS
6. HAVE YOU EVER DONE ANYTHING, STARTED TO DO ANYTHING, OR PREPARED TO DO ANYTHING TO END YOUR LIFE?: NO
1. IN THE PAST MONTH, HAVE YOU WISHED YOU WERE DEAD OR WISHED YOU COULD GO TO SLEEP AND NOT WAKE UP?: NO
2. HAVE YOU ACTUALLY HAD ANY THOUGHTS OF KILLING YOURSELF?: NO

## 2025-02-26 ASSESSMENT — PAIN SCALES - GENERAL
PAINLEVEL_OUTOF10: 6
PAINLEVEL_OUTOF10: 10 - WORST POSSIBLE PAIN

## 2025-02-26 ASSESSMENT — PAIN DESCRIPTION - LOCATION: LOCATION: THROAT

## 2025-02-26 ASSESSMENT — PAIN DESCRIPTION - PAIN TYPE: TYPE: ACUTE PAIN

## 2025-02-26 ASSESSMENT — PAIN - FUNCTIONAL ASSESSMENT: PAIN_FUNCTIONAL_ASSESSMENT: 0-10

## 2025-02-26 ASSESSMENT — PAIN DESCRIPTION - DESCRIPTORS: DESCRIPTORS: SHARP

## 2025-02-26 NOTE — ED PROVIDER NOTES
HPI   Chief Complaint   Patient presents with    Sore Throat     Pt c/o sore throat pain since Monday evening and then began vomiting this morning. Pt states fever comes and goes. Productive cough, white mucus.       Limitations to History: None    HPI: 69-year-old female presents with concern for sore throat, nausea, vomiting, cough.  Present over the past 2 days.  Denies any fever, chills, urinary symptoms, abdominal pain.    ------------------------------------------------------------------------------------------------------------------------------------------  Physical Exam:    VS: As documented in the triage note and EMR flowsheet from this visit were reviewed.    Appearance: Alert. cooperative,  in no acute distress.   Skin: Intact,  dry skin, no lesions, rash, petechiae or purpura.   Eyes: PERRLA, EOMs intact,  Conjunctiva pink with no redness or exudates.   HENT: Normocephalic, atraumatic. Nares patent. No intraoral lesions.   Neck: Supple, without meningismus. Trachea at midline. No lymphadenopathy.  Pulmonary: Clear bilaterally with good chest wall excursion. No rales, rhonchi or wheezing. No accessory muscle use or stridor.  Cardiac: Regular rate and rhythm, no rubs, murmurs, or gallops.   Abdomen: Abdomen is soft, nontender, and nondistended.  No palpable organomegaly.  No rebound or guarding.  No CVA tenderness. Nonsurgical abdomen.  Psychiatric: Appropriate mood and affect.                Patient History   Past Medical History:   Diagnosis Date    COVID-19 2021    COVID-19 virus infection    COVID-19 vaccine series declined     Osteoarthritis of hip 2023    Rosacea     Trochanteric bursitis, left hip 10/16/2017    Trochanteric bursitis of left hip    Unspecified fracture of lower end of unspecified humerus, initial encounter for closed fracture     Elbow fracture     Past Surgical History:   Procedure Laterality Date     SECTION, CLASSIC  2014     Section    ELBOW  SURGERY Right 05/05/2014    Treatment Of The Right Elbow    KNEE ARTHROSCOPY W/ DEBRIDEMENT  05/05/2014    Arthroscopy Knee Right    TOTAL HIP ARTHROPLASTY  05/17/2021    Hip replacement     Family History   Problem Relation Name Age of Onset    Hypertension Mother      Coronary artery disease Mother      Diabetes Mother      Gout Father      Heart attack Father      Cirrhosis Sister      Cancer Brother      Heart attack Son       Social History     Tobacco Use    Smoking status: Never    Smokeless tobacco: Never   Substance Use Topics    Alcohol use: Not Currently    Drug use: Never       Physical Exam   ED Triage Vitals [02/26/25 0926]   Temperature Heart Rate Respirations BP   37.6 °C (99.6 °F) 91 16 178/62      Pulse Ox Temp Source Heart Rate Source Patient Position   94 % Oral Monitor Sitting      BP Location FiO2 (%)     Left arm --       Physical Exam      ED Course & MDM   Diagnoses as of 02/26/25 1210   Viral pharyngitis   Bronchitis                 No data recorded     Mraty Coma Scale Score: 15 (02/26/25 0929 : Angelika Samson RN)                           Medical Decision Making  Labs Reviewed  GROUP A STREPTOCOCCUS, PCR - Normal     Group A Strep PCR                                 SARS-COV-2 PCR - Normal     Coronavirus 2019, PCR                                    Narrative: This assay is an FDA-cleared, in vitro diagnostic nucleic acid amplification test for the qualitative detection and differentiation of SARS CoV-2 from nasopharyngeal specimens collected from individuals with signs and symptoms of respiratory tract infections, and has been validated for use at [facility]. Negative results do not preclude COVID-19 infections and should not be used as the sole basis for diagnosis, treatment, or other management decisions. Testing for SARS CoV-2 is recommended only for patients who meet current clinical and/or epidemiological criteria defined by federal, state, or local  public health directives.  INFLUENZA A AND B PCR - Normal  XR chest 1 view   Final Result    No acute cardiopulmonary abnormality.    Signed by Thom Gottlieb MD     Medical Decision Making:    Patient appears well nontoxic.  Treated with ODT Zofran.  Viral swabs negative.  Chest x-ray clear.  Patient treated with 10 mg of oral Decadron.  Patient be treated with doxycycline to cover both her cough as well as pharyngitis.  Will be given ODT Zofran advised on follow-up with primary care.  Albuterol inhaler also sent to the pharmacy.  Stable at time of discharge.    Differential Diagnoses Considered: Bronchitis, pneumonia, pharyngitis, viral illness    Independent Interpretation of Studies:  I independently interpreted: Chest x-ray shows no evidence of pneumonia or pneumothorax.    Escalation of Care:  Appropriate for discharge and follow-up with primary care.    Prescription Drug Consideration: ODT Zofran, doxycycline, albuterol inhaler          Procedure  Procedures     Fortino Blum,   02/26/25 1212

## 2025-03-07 ENCOUNTER — TELEPHONE (OUTPATIENT)
Dept: PRIMARY CARE | Facility: CLINIC | Age: 70
End: 2025-03-07
Payer: COMMERCIAL

## 2025-03-07 NOTE — TELEPHONE ENCOUNTER
Pt has an apt 03/19 that she is unable to make it to. The next available that she would be able to make is the end of June and she has an apt before that.     Please advise.

## 2025-03-16 RX ORDER — DILTIAZEM HYDROCHLORIDE 360 MG/1
1 CAPSULE, EXTENDED RELEASE ORAL
COMMUNITY
Start: 2025-02-06 | End: 2025-03-19 | Stop reason: DRUGHIGH

## 2025-03-19 ENCOUNTER — APPOINTMENT (OUTPATIENT)
Dept: RHEUMATOLOGY | Facility: CLINIC | Age: 70
End: 2025-03-19
Payer: COMMERCIAL

## 2025-03-19 DIAGNOSIS — N18.31 STAGE 3A CHRONIC KIDNEY DISEASE (MULTI): ICD-10-CM

## 2025-03-19 DIAGNOSIS — M35.9 UNDIFFERENTIATED CONNECTIVE TISSUE DISEASE (MULTI): Primary | ICD-10-CM

## 2025-03-19 DIAGNOSIS — Z79.1 NSAID LONG-TERM USE: ICD-10-CM

## 2025-03-19 DIAGNOSIS — Z79.899 MEDICATION MANAGEMENT: ICD-10-CM

## 2025-03-19 PROCEDURE — 99213 OFFICE O/P EST LOW 20 MIN: CPT | Performed by: INTERNAL MEDICINE

## 2025-03-19 PROCEDURE — 1036F TOBACCO NON-USER: CPT | Performed by: INTERNAL MEDICINE

## 2025-03-19 PROCEDURE — 1160F RVW MEDS BY RX/DR IN RCRD: CPT | Performed by: INTERNAL MEDICINE

## 2025-03-19 PROCEDURE — 1159F MED LIST DOCD IN RCRD: CPT | Performed by: INTERNAL MEDICINE

## 2025-03-19 PROCEDURE — 1158F ADVNC CARE PLAN TLK DOCD: CPT | Performed by: INTERNAL MEDICINE

## 2025-03-19 PROCEDURE — 1123F ACP DISCUSS/DSCN MKR DOCD: CPT | Performed by: INTERNAL MEDICINE

## 2025-03-19 RX ORDER — HYDROCODONE BITARTRATE AND ACETAMINOPHEN 7.5; 325 MG/1; MG/1
1 TABLET ORAL EVERY 6 HOURS PRN
Qty: 120 TABLET | Refills: 0 | Status: SHIPPED | OUTPATIENT
Start: 2025-03-25 | End: 2026-03-25

## 2025-03-19 RX ORDER — DILTIAZEM HYDROCHLORIDE 360 MG/1
360 CAPSULE, EXTENDED RELEASE ORAL
Qty: 30 CAPSULE | Refills: 11 | Status: SHIPPED | OUTPATIENT
Start: 2025-03-19 | End: 2026-03-19

## 2025-03-19 ASSESSMENT — ENCOUNTER SYMPTOMS
JOINT SWELLING: 0
FATIGUE: 0
COUGH: 1
EYES NEGATIVE: 1
BACK PAIN: 0
SHORTNESS OF BREATH: 0
WEAKNESS: 0
ENDOCRINE NEGATIVE: 1
GASTROINTESTINAL NEGATIVE: 1
FEVER: 0
MYALGIAS: 0
COLOR CHANGE: 0
NUMBNESS: 0
PSYCHIATRIC NEGATIVE: 1
ARTHRALGIAS: 1

## 2025-03-19 NOTE — PROGRESS NOTES
[facility] RHEUMATOLOGY     AND INTERNAL MEDICINE    RHEUMATOLOGY PROGRESS NOTE    Randee Munoz 69 y.o. female  :  1955  PCP:  Maryann Sewell, DO    Chief Complaint   Patient presents with    UCTD   Virtual or Telephone Consent    An interactive audio and video telecommunication system which permits real time communications between the patient (at the originating site) and provider (at the distant site) was utilized to provide this telehealth service.   Verbal consent was requested and obtained from Randee Munoz on this date, 25 for a telehealth visit and the patient's location was confirmed at the time of the visit.      SUBJECTIVE  Pt converted visit to virtual due to having bronchitis.  Is feeling better.  Pt stated CTD has been stable.   No new areas of pain.   Has been starting to do yard work and happy that her pain didn't increase a lot  No new areas of pain and not having any new symptoms    OARRS:  Jesusita Gusman MD on 3/19/2025 10:12 AM  I have personally reviewed the OARRS report for Randee Munoz. I have considered the risks of abuse, dependence, addiction and diversion and I believe that it is clinically appropriate for Randee Munoz to be prescribed this medication    Is the patient prescribed a combination of a benzodiazepine and opioid?  No    Last Urine Drug Screen / ordered today: No  Recent Results (from the past 8760 hours)   Confirmation Opiate/Opioid/Benzo Prescription Compliance    Collection Time: 24  8:33 AM   Result Value Ref Range    Clonazepam <25 <25 ng/mL    7-Aminoclonazepam <25 <25 ng/mL    Alprazolam <25 <25 ng/mL    Alpha-Hydroxyalprazolam <25 <25 ng/mL    Midazolam <25 <25 ng/mL    Alpha-Hydroxymidazolam <25 <25 ng/mL    Chlordiazepoxide <25 <25 ng/mL    Diazepam <25 <25 ng/mL    Nordiazepam <25 <25 ng/mL    Temazepam <25 <25 ng/mL    Oxazepam <25 <25 ng/mL    Lorazepam <25  <25 ng/mL    Methadone <25 <25 ng/mL    EDDP <25 <25 ng/mL    6-Acetylmorphine <25 <25 ng/mL    Codeine <50 <50 ng/mL    Hydrocodone 1,636 (H) <25 ng/mL    Hydromorphone 867 (H) <25 ng/mL    Morphine  <50 <50 ng/mL    Norhydrocodone >1,000 (H) <25 ng/mL    Noroxycodone <25 <25 ng/mL    Oxycodone <25 <25 ng/mL    Oxymorphone <25 <25 ng/mL    Fentanyl <2.5 <2.5 ng/mL    Norfentanyl <2.5 <2.5 ng/mL    Tramadol <50 <50 ng/mL    O-Desmethyltramadol <50 <50 ng/mL    Zolpidem <25 <25 ng/mL    Zolpidem Metabolite (ZCA) <25 <25 ng/mL   Screen Opiate/Opioid/Benzo Prescription Compliance    Collection Time: 06/24/24  8:33 AM   Result Value Ref Range    Creatinine, Urine Random 126.5 20.0 - 320.0 mg/dL    Amphetamine Screen, Urine Presumptive Negative Presumptive Negative    Barbiturate Screen, Urine Presumptive Negative Presumptive Negative    Cannabinoid Screen, Urine Presumptive Negative Presumptive Negative    Cocaine Metabolite Screen, Urine Presumptive Negative Presumptive Negative    PCP Screen, Urine Presumptive Negative Presumptive Negative     Results are as expected.         Controlled Substance Agreement:  Date of the Last Agreement: october Reviewed Controlled Substance Agreement including but not limited to the benefits, risks, and alternatives to treatment with a Controlled Substance medication(s).    Opioids:  What is the patient's goal of therapy? Pain relief  Is this being achieved with current treatment? yes    I have calculated the patient's Morphine Dose Equivalent (MED):   I have considered referral to Pain Management and/or a specialist, and do not feel it is necessary at this time.    I feel that it is clinically indicated to continue this current medication regimen after consideration of alternative therapies, and other non-opioid treatment.    Opioid Risk Screening:  No change    Pain Assessment:  Medication provides relief and improves function    Records since last seen reviewed in Epic, Epic  [facility] Record  Patient Active Problem List    Diagnosis Date Noted    NSAID long-term use 06/22/2024    Primary osteoarthritis of left knee 04/03/2024    Medication management 10/03/2023    Benign essential hypertension 08/23/2023    Stage 3a chronic kidney disease (Multi) 08/23/2023    Undifferentiated connective tissue disease (Multi) 08/23/2023    Class 3 severe obesity due to excess calories with serious comorbidity and body mass index (BMI) of 40.0 to 44.9 in adult 08/23/2023    Eczema 08/23/2023     Past Medical History:   Diagnosis Date    COVID-19 12/29/2021    COVID-19 virus infection    COVID-19 vaccine series declined     Osteoarthritis of hip 08/23/2023    Rosacea     Trochanteric bursitis, left hip 10/16/2017    Trochanteric bursitis of left hip    Unspecified fracture of lower end of unspecified humerus, initial encounter for closed fracture     Elbow fracture     Current Outpatient Medications on File Prior to Visit   Medication Sig Dispense Refill    cholecalciferol (Vitamin D-3) 1,250 mcg (50,000 unit) capsule Take 1 capsule (50,000 Units) by mouth 1 (one) time per week.      cyanocobalamin, vitamin B-12, 2,500 mcg tablet, sublingual SL tablet Place 1 tablet (2,500 mcg) under the tongue once daily.      etodolac (Lodine) 400 mg tablet Take 1 tablet (400 mg) by mouth 2 times a day as needed (pain). 180 tablet 3    hydroCHLOROthiazide (HYDRODiuril) 25 mg tablet Take 1 tablet (25 mg) by mouth once daily. 90 tablet 3    HYDROcodone-acetaminophen (Norco) 7.5-325 mg tablet Take 1 tablet by mouth every 6 hours if needed for moderate pain (4 - 6). pain Do not fill before February 23, 2025. 120 tablet 0    ondansetron ODT (Zofran-ODT) 4 mg disintegrating tablet Dissolve 1 tablet (4 mg) in the mouth every 8 hours if needed for nausea or vomiting. 15 tablet 0    triamcinolone (Kenalog) 0.1 % cream Apply 1 Application topically 2 times a day. 2 or 3 times daily 45 g 1    [DISCONTINUED]  dilTIAZem ER (Tiazac) 360 mg 24 hr capsule Take 1 capsule (360 mg) by mouth early in the morning.. (Patient taking differently: Take 1 capsule (360 mg) by mouth early in the morning.. Pt states on 380 mg)      [DISCONTINUED] albuterol 90 mcg/actuation inhaler Inhale 2 puffs every 6 hours if needed for wheezing or shortness of breath. (Patient not taking: Reported on 3/19/2025) 1 g 0    [DISCONTINUED] dilTIAZem CD (Cardizem CD) 180 mg 24 hr capsule Take 1 capsule (180 mg) by mouth once daily.       No current facility-administered medications on file prior to visit.     Allergies   Allergen Reactions    Sulfa (Sulfonamide Antibiotics) Hives    Aspirin Other     sensitive    Opioids - Morphine Analogues Nausea/vomiting    Clindamycin Hives and Rash     Social History     Tobacco Use    Smoking status: Never    Smokeless tobacco: Never   Substance Use Topics    Alcohol use: Not Currently    Drug use: Never     Review of Systems   Constitutional:  Negative for fatigue and fever.   HENT: Negative.     Eyes: Negative.    Respiratory:  Positive for cough. Negative for shortness of breath.    Cardiovascular:  Negative for leg swelling.   Gastrointestinal: Negative.    Endocrine: Negative.    Genitourinary: Negative.    Musculoskeletal:  Positive for arthralgias. Negative for back pain, gait problem, joint swelling and myalgias.   Skin:  Negative for color change and rash.   Neurological:  Negative for weakness and numbness.   Psychiatric/Behavioral: Negative.         PHYSICAL EXAM  There were no vitals taken for this visit.  Depression: Not at risk (3/19/2025)    PHQ-2     PHQ-2 Score: 0     Physical Exam  Vitals reviewed.   Constitutional:       General: She is not in acute distress.     Appearance: Normal appearance. She is not ill-appearing.   HENT:      Head: Normocephalic and atraumatic.   Eyes:      Extraocular Movements: Extraocular movements intact.      Conjunctiva/sclera: Conjunctivae normal.      Pupils: Pupils  are equal, round, and reactive to light.   Pulmonary:      Effort: Pulmonary effort is normal. No respiratory distress.   Musculoskeletal:      Cervical back: Normal range of motion.      Comments: No visible abnormalities noted   Skin:     Findings: No rash.   Neurological:      General: No focal deficit present.      Mental Status: She is alert and oriented to person, place, and time. Mental status is at baseline.   Psychiatric:         Mood and Affect: Mood normal.           Health Maintenance Due   Topic Date Due    Medicare Annual Wellness Visit (AWV)  Never done    Bone Density Scan  Never done    Colorectal Cancer Screening  Never done    Hepatitis C Screening  Never done    Diabetes Screening  Never done    CKD: Urine Protein Screening  Never done    Zoster Vaccines (1 of 2) Never done    RSV High Risk: (Elderly (60+) or Pregnant Population) (1 - Risk 60-74 years 1-dose series) Never done    Pneumococcal Vaccine (2 of 2 - PCV) 02/01/2022    Influenza Vaccine (1) 09/01/2024       Assessment/plan  Assessment & Plan  Undifferentiated connective tissue disease (Multi)  On NSAID therapy.  Stable without signs of disease progression on exam.  Plan for lab next visit  Orders:    Follow Up In Rheumatology; Future    HYDROcodone-acetaminophen (Norco) 7.5-325 mg tablet; Take 1 tablet by mouth every 6 hours if needed for moderate pain (4 - 6). pain Do not fill before March 25, 2025.    NSAID long-term use  Will need to monitor usage as she does have mild CKD  Orders:    Follow Up In Rheumatology; Future    Medication management  Review of CSA done and  to continue to adhere to policy  CSA updated in EMR:      Orders:    Follow Up In Rheumatology; Future    Stage 3a chronic kidney disease (Multi)  Will continue to monitor and avoid escalation of nephrotoxic meds  Orders:    Follow Up In Rheumatology; Future      Follow up: _3____months, sooner if change in symptoms    Immunization History   Administered Date(s)  Administered    Influenza, Unspecified 10/22/2014    Influenza, seasonal, injectable 10/26/2009, 11/08/2010, 01/27/2012, 10/22/2014    Pneumococcal polysaccharide vaccine, 23-valent, age 2 years and older (PNEUMOVAX 23) 02/01/2021    Tdap vaccine, age 7 year and older (BOOSTRIX, ADACEL) 01/01/2019

## 2025-03-19 NOTE — ASSESSMENT & PLAN NOTE
Will need to monitor usage as she does have mild CKD  Orders:    Follow Up In Rheumatology; Future

## 2025-03-19 NOTE — ASSESSMENT & PLAN NOTE
On NSAID therapy.  Stable without signs of disease progression on exam.  Plan for lab next visit  Orders:    Follow Up In Rheumatology; Future    HYDROcodone-acetaminophen (Norco) 7.5-325 mg tablet; Take 1 tablet by mouth every 6 hours if needed for moderate pain (4 - 6). pain Do not fill before March 25, 2025.

## 2025-03-19 NOTE — PATIENT INSTRUCTIONS
It was a pleasure to see you today  Please call if your symptoms worsen  Please review your summary for education and reminders.  Follow up at your next appointment.    If you had labs/xrays done today, you will be able to view on [employer].   We will contact you when the results are reviewed for further discussion.  Please note that you may receive your results before I have had a chance to review.  Please know I will be contacting you for discussion  Homegoing instructions for all patient  A healthy lifestyle helps chronic diseases  These are all the goals you should strive to improve your overall health   Blood pressure <130/85   BMI of <30 or waist circumference that is 1/2 of your height   Fasting blood sugar <107 (if you are diabetic, aim for an A1c <6.4%_   LDL cholesterol <130   Avoid smoking   Manage your stress   Get your preventive exams   Get your immunizations

## 2025-03-19 NOTE — ASSESSMENT & PLAN NOTE
Review of CSA done and  to continue to adhere to policy  CSA updated in EMR:      Orders:    Follow Up In Rheumatology; Future

## 2025-03-19 NOTE — ASSESSMENT & PLAN NOTE
Will continue to monitor and avoid escalation of nephrotoxic meds  Orders:    Follow Up In Rheumatology; Future

## 2025-04-28 ENCOUNTER — TELEPHONE (OUTPATIENT)
Dept: RHEUMATOLOGY | Facility: CLINIC | Age: 70
End: 2025-04-28
Payer: COMMERCIAL

## 2025-04-28 DIAGNOSIS — M35.9 UNDIFFERENTIATED CONNECTIVE TISSUE DISEASE (MULTI): ICD-10-CM

## 2025-04-28 RX ORDER — HYDROCODONE BITARTRATE AND ACETAMINOPHEN 7.5; 325 MG/1; MG/1
1 TABLET ORAL EVERY 6 HOURS PRN
Qty: 120 TABLET | Refills: 0 | Status: SHIPPED | OUTPATIENT
Start: 2025-04-28 | End: 2026-04-28

## 2025-04-28 NOTE — TELEPHONE ENCOUNTER
Pt calling to req RX RF on:    Hydrocodone - Acetaminophen 7.5 - 325 mg     Please send to:     [facility] [place]  (verified)

## 2025-05-29 ENCOUNTER — TELEPHONE (OUTPATIENT)
Dept: RHEUMATOLOGY | Facility: CLINIC | Age: 70
End: 2025-05-29
Payer: COMMERCIAL

## 2025-05-29 DIAGNOSIS — M35.9 UNDIFFERENTIATED CONNECTIVE TISSUE DISEASE (MULTI): ICD-10-CM

## 2025-05-29 RX ORDER — HYDROCODONE BITARTRATE AND ACETAMINOPHEN 7.5; 325 MG/1; MG/1
1 TABLET ORAL EVERY 6 HOURS PRN
Qty: 120 TABLET | Refills: 0 | Status: SHIPPED | OUTPATIENT
Start: 2025-05-29 | End: 2026-05-29

## 2025-05-29 NOTE — TELEPHONE ENCOUNTER
Pt calling for RX RF on:     Norco 7.5 - 325 mg    Please send to:     [facility] [place] (verified)

## 2025-05-29 NOTE — TELEPHONE ENCOUNTER
Pt is calling for refill of    HYDROcodone-acetaminophen (Norco) 7.5-325 mg tablet       [id number] - [place], [place] - [address]  [address]  [facility] [address]  Phone: [phone] Fax: [phone]

## 2025-06-18 ENCOUNTER — APPOINTMENT (OUTPATIENT)
Dept: RHEUMATOLOGY | Facility: CLINIC | Age: 70
End: 2025-06-18
Payer: COMMERCIAL

## 2025-06-23 ENCOUNTER — APPOINTMENT (OUTPATIENT)
Dept: RHEUMATOLOGY | Facility: CLINIC | Age: 70
End: 2025-06-23
Payer: COMMERCIAL

## 2025-06-25 ENCOUNTER — OFFICE VISIT (OUTPATIENT)
Dept: RHEUMATOLOGY | Facility: CLINIC | Age: 70
End: 2025-06-25
Payer: COMMERCIAL

## 2025-06-25 VITALS
OXYGEN SATURATION: 95 % | DIASTOLIC BLOOD PRESSURE: 72 MMHG | TEMPERATURE: 98.4 F | HEIGHT: 58 IN | WEIGHT: 192.6 LBS | BODY MASS INDEX: 40.43 KG/M2 | SYSTOLIC BLOOD PRESSURE: 155 MMHG | HEART RATE: 62 BPM

## 2025-06-25 DIAGNOSIS — M35.9 UNDIFFERENTIATED CONNECTIVE TISSUE DISEASE (MULTI): Primary | ICD-10-CM

## 2025-06-25 DIAGNOSIS — Z79.899 MEDICATION MANAGEMENT: ICD-10-CM

## 2025-06-25 DIAGNOSIS — Z79.1 NSAID LONG-TERM USE: ICD-10-CM

## 2025-06-25 PROCEDURE — 1159F MED LIST DOCD IN RCRD: CPT | Performed by: INTERNAL MEDICINE

## 2025-06-25 PROCEDURE — 1036F TOBACCO NON-USER: CPT | Performed by: INTERNAL MEDICINE

## 2025-06-25 PROCEDURE — 99214 OFFICE O/P EST MOD 30 MIN: CPT | Performed by: INTERNAL MEDICINE

## 2025-06-25 PROCEDURE — 3077F SYST BP >= 140 MM HG: CPT | Performed by: INTERNAL MEDICINE

## 2025-06-25 PROCEDURE — 3078F DIAST BP <80 MM HG: CPT | Performed by: INTERNAL MEDICINE

## 2025-06-25 PROCEDURE — 3008F BODY MASS INDEX DOCD: CPT | Performed by: INTERNAL MEDICINE

## 2025-06-25 PROCEDURE — 1160F RVW MEDS BY RX/DR IN RCRD: CPT | Performed by: INTERNAL MEDICINE

## 2025-06-25 RX ORDER — HYDROCODONE BITARTRATE AND ACETAMINOPHEN 7.5; 325 MG/1; MG/1
1 TABLET ORAL EVERY 6 HOURS PRN
Qty: 120 TABLET | Refills: 0 | Status: SHIPPED | OUTPATIENT
Start: 2025-06-28 | End: 2026-06-28

## 2025-06-25 ASSESSMENT — ENCOUNTER SYMPTOMS
ENDOCRINE NEGATIVE: 1
ARTHRALGIAS: 1
BACK PAIN: 0
EYES NEGATIVE: 1
COUGH: 0
NUMBNESS: 0
FATIGUE: 0
JOINT SWELLING: 1
MYALGIAS: 0
GASTROINTESTINAL NEGATIVE: 1
WEAKNESS: 0
FEVER: 0
SHORTNESS OF BREATH: 0
COLOR CHANGE: 0
PSYCHIATRIC NEGATIVE: 1

## 2025-06-25 ASSESSMENT — PATIENT HEALTH QUESTIONNAIRE - PHQ9
2. FEELING DOWN, DEPRESSED OR HOPELESS: NOT AT ALL
1. LITTLE INTEREST OR PLEASURE IN DOING THINGS: NOT AT ALL
SUM OF ALL RESPONSES TO PHQ9 QUESTIONS 1 AND 2: 0

## 2025-06-25 NOTE — ASSESSMENT & PLAN NOTE
On NSAID therapy and largely stable.   Developed what appears to be a venous stasis dermatitis that is improving  Labs ordered today to monitor for increased disease activity, end organ manifestations and to monitor for any drug toxicities due to chronic medications      Orders:    Follow Up In Rheumatology; Future    CBC and Auto Differential; Future    Comprehensive Metabolic Panel; Future    C-Reactive Protein; Future    Sedimentation Rate; Future    ARIC with Reflex to [facility]ARY; Future    HYDROcodone-acetaminophen (Norco) 7.5-325 mg tablet; Take 1 tablet by mouth every 6 hours if needed for moderate pain (4 - 6). pain Do not fill before June 28, 2025.

## 2025-06-25 NOTE — PATIENT INSTRUCTIONS
It was a pleasure to see you today  Please call if your symptoms worsen  Please review your summary for education and reminders.  Follow up at your next appointment.    If you had labs/xrays done today, you will be able to view on ice.   We will contact you when the results are reviewed for further discussion.  Please note that you may receive your results before I have had a chance to review.  Please know I will be contacting you for discussion  Homegoing instructions for all patient  A healthy lifestyle helps chronic diseases  These are all the goals you should strive to improve your overall health   Blood pressure <130/85   BMI of <30 or waist circumference that is 1/2 of your height   Fasting blood sugar <107 (if you are diabetic, aim for an A1c <6.4%_   LDL cholesterol <130   Avoid smoking   Manage your stress   Get your preventive exams   Get your immunizations   You are on an anti-inflammatory medication.  Always make sure you take this medication with food.   You increase your risk of an ulcer if not taken correctly.  Recent studies have shown there is an increased risk of heart attacks and stroke with chronic use.  Discontinue and see your doctor if you have any suspicious symptoms.    If possible, only take this medication on an as needed basis   Common Emergency Awareness Tips   IS IT A STROKE?   Act FAST and Check for these signs:   FACE Does the face look uneven?   ARM Does one arm drift down?   SPEECH Does their speech sound strange?   TIME Call 9-1-1 at any sign of stroke     Heart Attack Signs   Chest discomfort: Most heart attacks involve discomfort in the center of the chest and lasts more than a few minutes, or goes away and comes back. It can feel like uncomfortable pressure, squeezing, fullness or pain.   Discomfort in upper body: Symptoms can include pain or discomfort in one or both arms, back, neck, jaw or stomach.   Shortness of breath: With or without discomfort.   Other signs: Breaking  out in a cold sweat, nausea, or lightheaded.   Remember, MINUTES DO MATTER. If you experience any of these heart attack warning signs, call 9-1-1 to get immediate medical attention!

## 2025-06-25 NOTE — LETTER
2025     Maryann Sewell DO  [address] [facility] [address]    Patient: Randee Munoz   YOB: 1955   Date of Visit: 2025       Dear Dr. Maryann Sewell DO:    Thank you for referring Randee Munoz to me for evaluation. Below are my notes for this visit.  If you have questions, please do not hesitate to call me. I look forward to following your patient along with you.       Sincerely,     Jesusita Gusman MD      CC: No Recipients  ______________________________________________________________________________________                                                    [facility] RHEUMATOLOGY     AND INTERNAL MEDICINE    RHEUMATOLOGY PROGRESS NOTE    Randee Munoz 69 y.o. female  :  1955      Chief Complaint   Patient presents with   • UCTD     Pt req RX RF on [facility].  Having flare up on BLE.       SUBJECTIVE  Has been noting more pain   Recently developed B leg swelling with redness.   Saw PCP yesterday and given a topical agent which is really helping now.  Notes some swelling with the warm summer months  Meds are working well      OARRS:  Jesusita Gusman MD on 2025  1:25 PM  I have personally reviewed the OARRS report for Randee Munoz. I have considered the risks of abuse, dependence, addiction and diversion and I believe that it is clinically appropriate for Randee Munoz to be prescribed this medication    Is the patient prescribed a combination of a benzodiazepine and opioid?  No    Last Urine Drug Screen / ordered today: Yes  2024  Results are as expected.     Controlled Substance Agreement:   Agreement: on file  Reviewed Controlled Substance Agreement including but not limited to the benefits, risks, and alternatives to treatment with a Controlled Substance medication(s).    Opioids:  What is the patient's goal of therapy? Pain relief  Is this being achieved with current treatment? yes    I have calculated the  patient's Morphine Dose Equivalent (MED):   I have considered referral to Pain Management and/or a specialist, and do not feel it is necessary at this time.    I feel that it is clinically indicated to continue this current medication regimen after consideration of alternative therapies, and other non-opioid treatment.    Opioid Risk Screening:  No change in risk    Pain Assessment:  Controlled substance questionnaire    On scale of 0-10  -pain on average over the last week? 7  -pain at its worst over the last week? 9  %pain relieved by meds? 95  Amount of pain relief with meds make a difference? yes  MD agrees with efficacy of med? yes    Better/same/worse  Physical functioning better  Family relationships better  Social relationships better  Mood better    Sleep pattern better  Overall functioning better  Side effects? no    Records since last seen reviewed in [employer], [facility] and [facility] Record  Patient Active Problem List    Diagnosis Date Noted   • NSAID long-term use 06/22/2024   • Primary osteoarthritis of left knee 04/03/2024   • Medication management 10/03/2023   • Benign essential hypertension 08/23/2023   • Stage 3a chronic kidney disease (Multi) 08/23/2023   • Undifferentiated connective tissue disease (Multi) 08/23/2023   • Class 3 severe obesity due to excess calories with serious comorbidity and body mass index (BMI) of 40.0 to 44.9 in adult 08/23/2023   • Eczema 08/23/2023     Past Medical History:   Diagnosis Date   • COVID-19 12/29/2021    COVID-19 virus infection   • COVID-19 vaccine series declined    • Osteoarthritis of hip 08/23/2023   • Rosacea    • Trochanteric bursitis, left hip 10/16/2017    Trochanteric bursitis of left hip   • Unspecified fracture of lower end of unspecified humerus, initial encounter for closed fracture     Elbow fracture     Current Outpatient Medications on File Prior to Visit   Medication Sig Dispense Refill   • cholecalciferol (Vitamin D-3) 1,250 mcg  (50,000 unit) capsule Take 1 capsule (1.25 mg) by mouth 1 (one) time per week.     • cyanocobalamin, vitamin B-12, 2,500 mcg tablet, sublingual SL tablet Place 1 tablet (2,500 mcg) under the tongue once daily.     • dilTIAZem ER (Tiazac) 360 mg 24 hr capsule Take 1 capsule (360 mg) by mouth early in the morning.. 30 capsule 11   • etodolac (Lodine) 400 mg tablet Take 1 tablet (400 mg) by mouth 2 times a day as needed (pain). 180 tablet 3   • hydroCHLOROthiazide (HYDRODiuril) 25 mg tablet Take 1 tablet (25 mg) by mouth once daily. 90 tablet 3   • ondansetron ODT (Zofran-ODT) 4 mg disintegrating tablet Dissolve 1 tablet (4 mg) in the mouth every 8 hours if needed for nausea or vomiting. 15 tablet 0   • triamcinolone (Kenalog) 0.1 % cream Apply 1 Application topically 2 times a day. 2 or 3 times daily 45 g 1   • [DISCONTINUED] HYDROcodone-acetaminophen (Norco) 7.5-325 mg tablet Take 1 tablet by mouth every 6 hours if needed for moderate pain (4 - 6). pain 120 tablet 0     No current facility-administered medications on file prior to visit.     Allergies   Allergen Reactions   • Sulfa (Sulfonamide Antibiotics) Hives   • Aspirin Other     sensitive   • Opioids - Morphine Analogues Nausea/vomiting   • Clindamycin Hives and Rash     Social History     Tobacco Use   • Smoking status: Never   • Smokeless tobacco: Never   Substance Use Topics   • Alcohol use: Not Currently   • Drug use: Never     Review of Systems   Constitutional:  Negative for fatigue and fever.   HENT: Negative.     Eyes: Negative.    Respiratory:  Negative for cough and shortness of breath.    Cardiovascular:  Negative for leg swelling.   Gastrointestinal: Negative.    Endocrine: Negative.    Genitourinary: Negative.    Musculoskeletal:  Positive for arthralgias and joint swelling. Negative for back pain, gait problem and myalgias.   Skin:  Positive for rash. Negative for color change.   Neurological:  Negative for weakness and numbness.  "  Psychiatric/Behavioral: Negative.         PHYSICAL EXAM  /72   Pulse 62   Temp 36.9 °C (98.4 °F)   Ht (!) 1.473 m (4' 10\")   Wt 87.4 kg (192 lb 9.6 oz)   SpO2 95%   BMI 40.25 kg/m²   Depression: Not at risk (6/25/2025)    PHQ-2    • PHQ-2 Score: 0     Physical Exam  Vitals reviewed.   Constitutional:       General: She is not in acute distress.     Appearance: Normal appearance.   HENT:      Head: Normocephalic and atraumatic.   Eyes:      General: No scleral icterus.     Extraocular Movements: Extraocular movements intact.      Conjunctiva/sclera: Conjunctivae normal.      Pupils: Pupils are equal, round, and reactive to light.   Pulmonary:      Effort: Pulmonary effort is normal. No respiratory distress.   Musculoskeletal:         General: No swelling, tenderness or deformity.      Cervical back: Normal range of motion and neck supple. No tenderness.      Right lower leg: No edema.      Left lower leg: No edema.      Comments: No synovitis of examined joints   Skin:     Coloration: Skin is not pale.      Findings: Rash (erythematous plaque on B shins  R>>L) present. No erythema.   Neurological:      General: No focal deficit present.      Mental Status: She is alert and oriented to person, place, and time. Mental status is at baseline.      Gait: Gait normal.   Psychiatric:         Mood and Affect: Mood normal.           Health Maintenance Due   Topic Date Due   • Medicare Annual Wellness Visit (AWV)  Never done   • Bone Density Scan  Never done   • Colorectal Cancer Screening  Never done   • Hepatitis C Screening  Never done   • CKD: Urine Protein Screening  Never done   • Zoster Vaccines (1 of 2) Never done   • RSV High Risk: (Elderly (60+) or Pregnant Population) (1 - Risk 60-74 years 1-dose series) Never done   • Pneumococcal Vaccine (2 of 2 - PCV) 02/01/2022       Assessment/plan  Assessment & Plan  Undifferentiated connective tissue disease (Multi)  On NSAID therapy and largely stable.   " Developed what appears to be a venous stasis dermatitis that is improving  Labs ordered today to monitor for increased disease activity, end organ manifestations and to monitor for any drug toxicities due to chronic medications      Orders:  •  Follow Up In Rheumatology; Future  •  CBC and Auto Differential; Future  •  Comprehensive Metabolic Panel; Future  •  C-Reactive Protein; Future  •  Sedimentation Rate; Future  •  ARIC with Reflex to MARY; Future  •  HYDROcodone-acetaminophen (Norco) 7.5-325 mg tablet; Take 1 tablet by mouth every 6 hours if needed for moderate pain (4 - 6). pain Do not fill before June 28, 2025.    NSAID long-term use    Orders:  •  Follow Up In Rheumatology; Future    Medication management  Review of CSA done and  to continue to adhere to policy  CSA updated in EMR:      Orders:  •  Follow Up In Rheumatology; Future  •  Opiate/Opioid/Benzo Prescription Compliance        Follow up: _3____months, sooner if change in symptoms    Immunization History   Administered Date(s) Administered   • Influenza, Unspecified 10/22/2014   • Influenza, seasonal, injectable 10/26/2009, 11/08/2010, 01/27/2012, 10/22/2014   • Pneumococcal polysaccharide vaccine, 23-valent, age 2 years and older (PNEUMOVAX 23) 02/01/2021   • Tdap vaccine, age 7 year and older (BOOSTRIX, ADACEL) 01/01/2019

## 2025-06-25 NOTE — PROGRESS NOTES
[facility] RHEUMATOLOGY     AND INTERNAL MEDICINE    RHEUMATOLOGY PROGRESS NOTE    Randee Munoz 69 y.o. female  :  1955      Chief Complaint   Patient presents with    UCTD     Pt req RX RF on [facility].  Having flare up on BLE.       SUBJECTIVE  Has been noting more pain   Recently developed B leg swelling with redness.   Saw PCP yesterday and given a topical agent which is really helping now.  Notes some swelling with the warm summer months  Meds are working well      OARRS:  Jesusita Gusman MD on 2025  1:25 PM  I have personally reviewed the OARRS report for Randee Munoz. I have considered the risks of abuse, dependence, addiction and diversion and I believe that it is clinically appropriate for Randee Munoz to be prescribed this medication    Is the patient prescribed a combination of a benzodiazepine and opioid?  No    Last Urine Drug Screen / ordered today: Yes  2024  Results are as expected.     Controlled Substance Agreement:   Agreement: on file  Reviewed Controlled Substance Agreement including but not limited to the benefits, risks, and alternatives to treatment with a Controlled Substance medication(s).    Opioids:  What is the patient's goal of therapy? Pain relief  Is this being achieved with current treatment? yes    I have calculated the patient's Morphine Dose Equivalent (MED):   I have considered referral to Pain Management and/or a specialist, and do not feel it is necessary at this time.    I feel that it is clinically indicated to continue this current medication regimen after consideration of alternative therapies, and other non-opioid treatment.    Opioid Risk Screening:  No change in risk    Pain Assessment:  Controlled substance questionnaire    On scale of 0-10  -pain on average over the last week? 7  -pain at its worst over the last week? 9  %pain relieved by meds? 95  Amount of pain relief with meds make  a difference? yes  MD agrees with efficacy of med? yes    Better/same/worse  Physical functioning better  Family relationships better  Social relationships better  Mood better    Sleep pattern better  Overall functioning better  Side effects? no    Records since last seen reviewed in [facility], [facility] and [facility] Record  Patient Active Problem List    Diagnosis Date Noted    NSAID long-term use 06/22/2024    Primary osteoarthritis of left knee 04/03/2024    Medication management 10/03/2023    Benign essential hypertension 08/23/2023    Stage 3a chronic kidney disease (Multi) 08/23/2023    Undifferentiated connective tissue disease (Multi) 08/23/2023    Class 3 severe obesity due to excess calories with serious comorbidity and body mass index (BMI) of 40.0 to 44.9 in adult 08/23/2023    Eczema 08/23/2023     Past Medical History:   Diagnosis Date    COVID-19 12/29/2021    COVID-19 virus infection    COVID-19 vaccine series declined     Osteoarthritis of hip 08/23/2023    Rosacea     Trochanteric bursitis, left hip 10/16/2017    Trochanteric bursitis of left hip    Unspecified fracture of lower end of unspecified humerus, initial encounter for closed fracture     Elbow fracture     Current Outpatient Medications on File Prior to Visit   Medication Sig Dispense Refill    cholecalciferol (Vitamin D-3) 1,250 mcg (50,000 unit) capsule Take 1 capsule (1.25 mg) by mouth 1 (one) time per week.      cyanocobalamin, vitamin B-12, 2,500 mcg tablet, sublingual SL tablet Place 1 tablet (2,500 mcg) under the tongue once daily.      dilTIAZem ER (Tiazac) 360 mg 24 hr capsule Take 1 capsule (360 mg) by mouth early in the morning.. 30 capsule 11    etodolac (Lodine) 400 mg tablet Take 1 tablet (400 mg) by mouth 2 times a day as needed (pain). 180 tablet 3    hydroCHLOROthiazide (HYDRODiuril) 25 mg tablet Take 1 tablet (25 mg) by mouth once daily. 90 tablet 3    ondansetron ODT (Zofran-ODT) 4 mg disintegrating tablet  "Dissolve 1 tablet (4 mg) in the mouth every 8 hours if needed for nausea or vomiting. 15 tablet 0    triamcinolone (Kenalog) 0.1 % cream Apply 1 Application topically 2 times a day. 2 or 3 times daily 45 g 1    [DISCONTINUED] HYDROcodone-acetaminophen (Norco) 7.5-325 mg tablet Take 1 tablet by mouth every 6 hours if needed for moderate pain (4 - 6). pain 120 tablet 0     No current facility-administered medications on file prior to visit.     Allergies   Allergen Reactions    Sulfa (Sulfonamide Antibiotics) Hives    Aspirin Other     sensitive    Opioids - Morphine Analogues Nausea/vomiting    Clindamycin Hives and Rash     Social History     Tobacco Use    Smoking status: Never    Smokeless tobacco: Never   Substance Use Topics    Alcohol use: Not Currently    Drug use: Never     Review of Systems   Constitutional:  Negative for fatigue and fever.   HENT: Negative.     Eyes: Negative.    Respiratory:  Negative for cough and shortness of breath.    Cardiovascular:  Negative for leg swelling.   Gastrointestinal: Negative.    Endocrine: Negative.    Genitourinary: Negative.    Musculoskeletal:  Positive for arthralgias and joint swelling. Negative for back pain, gait problem and myalgias.   Skin:  Positive for rash. Negative for color change.   Neurological:  Negative for weakness and numbness.   Psychiatric/Behavioral: Negative.         PHYSICAL EXAM  /72   Pulse 62   Temp 36.9 °C (98.4 °F)   Ht (!) 1.473 m (4' 10\")   Wt 87.4 kg (192 lb 9.6 oz)   SpO2 95%   BMI 40.25 kg/m²   Depression: Not at risk (6/25/2025)    PHQ-2     PHQ-2 Score: 0     Physical Exam  Vitals reviewed.   Constitutional:       General: She is not in acute distress.     Appearance: Normal appearance.   HENT:      Head: Normocephalic and atraumatic.   Eyes:      General: No scleral icterus.     Extraocular Movements: Extraocular movements intact.      Conjunctiva/sclera: Conjunctivae normal.      Pupils: Pupils are equal, round, and " reactive to light.   Pulmonary:      Effort: Pulmonary effort is normal. No respiratory distress.   Musculoskeletal:         General: No swelling, tenderness or deformity.      Cervical back: Normal range of motion and neck supple. No tenderness.      Right lower leg: No edema.      Left lower leg: No edema.      Comments: No synovitis of examined joints   Skin:     Coloration: Skin is not pale.      Findings: Rash (erythematous plaque on B shins  R>>L) present. No erythema.   Neurological:      General: No focal deficit present.      Mental Status: She is alert and oriented to person, place, and time. Mental status is at baseline.      Gait: Gait normal.   Psychiatric:         Mood and Affect: Mood normal.           Health Maintenance Due   Topic Date Due    Medicare Annual Wellness Visit (AWV)  Never done    Bone Density Scan  Never done    Colorectal Cancer Screening  Never done    Hepatitis C Screening  Never done    CKD: Urine Protein Screening  Never done    Zoster Vaccines (1 of 2) Never done    RSV High Risk: (Elderly (60+) or Pregnant Population) (1 - Risk 60-74 years 1-dose series) Never done    Pneumococcal Vaccine (2 of 2 - PCV) 02/01/2022       Assessment/plan  Assessment & Plan  Undifferentiated connective tissue disease (Multi)  On NSAID therapy and largely stable.   Developed what appears to be a venous stasis dermatitis that is improving  Labs ordered today to monitor for increased disease activity, end organ manifestations and to monitor for any drug toxicities due to chronic medications      Orders:    Follow Up In Rheumatology; Future    CBC and Auto Differential; Future    Comprehensive Metabolic Panel; Future    C-Reactive Protein; Future    Sedimentation Rate; Future    ARIC with Reflex to MARY; Future    HYDROcodone-acetaminophen (Norco) 7.5-325 mg tablet; Take 1 tablet by mouth every 6 hours if needed for moderate pain (4 - 6). pain Do not fill before June 28, 2025.    NSAID long-term  use    Orders:    Follow Up In Rheumatology; Future    Medication management  Review of CSA done and  to continue to adhere to policy  CSA updated in EMR:      Orders:    Follow Up In Rheumatology; Future    Opiate/Opioid/Benzo Prescription Compliance        Follow up: _3____months, sooner if change in symptoms    Immunization History   Administered Date(s) Administered    Influenza, Unspecified 10/22/2014    Influenza, seasonal, injectable 10/26/2009, 11/08/2010, 01/27/2012, 10/22/2014    Pneumococcal polysaccharide vaccine, 23-valent, age 2 years and older (PNEUMOVAX 23) 02/01/2021    Tdap vaccine, age 7 year and older (BOOSTRIX, ADACEL) 01/01/2019

## 2025-06-25 NOTE — ASSESSMENT & PLAN NOTE
Review of CSA done and  to continue to adhere to policy  CSA updated in EMR:      Orders:    Follow Up In Rheumatology; Future    Opiate/Opioid/Benzo Prescription Compliance

## 2025-06-26 LAB
ALBUMIN SERPL-MCNC: 4.6 G/DL (ref 3.6–5.1)
ALP SERPL-CCNC: 92 U/L (ref 37–153)
ALT SERPL-CCNC: 13 U/L (ref 6–29)
ANA SER QL IF: NEGATIVE
ANION GAP SERPL CALCULATED.4IONS-SCNC: 11 MMOL/L (CALC) (ref 7–17)
AST SERPL-CCNC: 17 U/L (ref 10–35)
BASOPHILS # BLD AUTO: 47 CELLS/UL (ref 0–200)
BASOPHILS NFR BLD AUTO: 0.5 %
BILIRUB SERPL-MCNC: 0.5 MG/DL (ref 0.2–1.2)
BUN SERPL-MCNC: 25 MG/DL (ref 7–25)
CALCIUM SERPL-MCNC: 9.4 MG/DL (ref 8.6–10.4)
CHLORIDE SERPL-SCNC: 100 MMOL/L (ref 98–110)
CO2 SERPL-SCNC: 30 MMOL/L (ref 20–32)
CREAT SERPL-MCNC: 1.37 MG/DL (ref 0.6–1)
CRP SERPL-MCNC: 10.2 MG/L
EGFRCR SERPLBLD CKD-EPI 2021: 41 ML/MIN/1.73M2
EOSINOPHIL # BLD AUTO: 207 CELLS/UL (ref 15–500)
EOSINOPHIL NFR BLD AUTO: 2.2 %
ERYTHROCYTE [DISTWIDTH] IN BLOOD BY AUTOMATED COUNT: 12.6 % (ref 11–15)
ERYTHROCYTE [SEDIMENTATION RATE] IN BLOOD BY WESTERGREN METHOD: 50 MM/H
GLUCOSE SERPL-MCNC: 90 MG/DL (ref 65–99)
HCT VFR BLD AUTO: 36.3 % (ref 35–45)
HGB BLD-MCNC: 11.6 G/DL (ref 11.7–15.5)
LYMPHOCYTES # BLD AUTO: 1260 CELLS/UL (ref 850–3900)
LYMPHOCYTES NFR BLD AUTO: 13.4 %
MCH RBC QN AUTO: 30.1 PG (ref 27–33)
MCHC RBC AUTO-ENTMCNC: 32 G/DL (ref 32–36)
MCV RBC AUTO: 94.3 FL (ref 80–100)
MONOCYTES # BLD AUTO: 724 CELLS/UL (ref 200–950)
MONOCYTES NFR BLD AUTO: 7.7 %
NEUTROPHILS # BLD AUTO: 7163 CELLS/UL (ref 1500–7800)
NEUTROPHILS NFR BLD AUTO: 76.2 %
PLATELET # BLD AUTO: 265 THOUSAND/UL (ref 140–400)
PMV BLD REES-ECKER: 9.2 FL (ref 7.5–12.5)
POTASSIUM SERPL-SCNC: 4.1 MMOL/L (ref 3.5–5.3)
PROT SERPL-MCNC: 7.3 G/DL (ref 6.1–8.1)
RBC # BLD AUTO: 3.85 MILLION/UL (ref 3.8–5.1)
SODIUM SERPL-SCNC: 141 MMOL/L (ref 135–146)
WBC # BLD AUTO: 9.4 THOUSAND/UL (ref 3.8–10.8)

## 2025-06-28 LAB
1OH-MIDAZOLAM UR-MCNC: NEGATIVE NG/ML
7AMINOCLONAZEPAM UR-MCNC: NEGATIVE NG/ML
A-OH ALPRAZ UR-MCNC: NEGATIVE NG/ML
A-OH-TRIAZOLAM UR-MCNC: NEGATIVE NG/ML
AMPHETAMINES UR QL: NEGATIVE NG/ML
BARBITURATES UR QL: NEGATIVE NG/ML
BZE UR QL: NEGATIVE NG/ML
CODEINE UR-MCNC: NEGATIVE NG/ML
CREAT UR-MCNC: 140.7 MG/DL
DRUG SCREEN COMMENT UR-IMP: ABNORMAL
EDDP UR-MCNC: NEGATIVE NG/ML
FENTANYL UR-MCNC: NEGATIVE NG/ML
HYDROCODONE UR-MCNC: 930 NG/ML
HYDROMORPHONE UR-MCNC: 861 NG/ML
LORAZEPAM UR-MCNC: NEGATIVE NG/ML
METHADONE UR-MCNC: NEGATIVE NG/ML
MORPHINE UR-MCNC: NEGATIVE NG/ML
NORDIAZEPAM UR-MCNC: NEGATIVE NG/ML
NORFENTANYL UR-MCNC: NEGATIVE NG/ML
NORHYDROCODONE UR CFM-MCNC: 1180 NG/ML
NOROXYCODONE UR CFM-MCNC: NEGATIVE NG/ML
NORTRAMADOL UR-MCNC: NEGATIVE NG/ML
OH-ETHYLFLURAZ UR-MCNC: NEGATIVE NG/ML
OXAZEPAM UR-MCNC: NEGATIVE NG/ML
OXIDANTS UR QL: NEGATIVE MCG/ML
OXYCODONE UR CFM-MCNC: NEGATIVE NG/ML
OXYMORPHONE UR CFM-MCNC: NEGATIVE NG/ML
PCP UR QL: NEGATIVE NG/ML
PH UR: 7.3 [PH] (ref 4.5–9)
QUEST 6 ACETYLMORPHINE: NEGATIVE NG/ML
QUEST NOTES AND COMMENTS: ABNORMAL
QUEST ZOLPIDEM: NEGATIVE NG/ML
TEMAZEPAM UR-MCNC: NEGATIVE NG/ML
THC UR QL: NEGATIVE NG/ML
TRAMADOL UR-MCNC: NEGATIVE NG/ML
ZOLPIDEM PHENYL-4-CARB UR CFM-MCNC: NEGATIVE NG/ML

## 2025-07-30 ENCOUNTER — TELEPHONE (OUTPATIENT)
Dept: PRIMARY CARE | Facility: CLINIC | Age: 70
End: 2025-07-30
Payer: COMMERCIAL

## 2025-07-30 DIAGNOSIS — M35.9 UNDIFFERENTIATED CONNECTIVE TISSUE DISEASE (MULTI): ICD-10-CM

## 2025-07-30 RX ORDER — HYDROCODONE BITARTRATE AND ACETAMINOPHEN 7.5; 325 MG/1; MG/1
1 TABLET ORAL EVERY 6 HOURS PRN
Qty: 120 TABLET | Refills: 0 | Status: SHIPPED | OUTPATIENT
Start: 2025-07-30 | End: 2026-07-30

## 2025-07-30 NOTE — TELEPHONE ENCOUNTER
Deepali called for a refill on her hydrocodone - acetaminophen 7.5-325    Please send to [facility]

## 2025-09-04 ENCOUNTER — TELEPHONE (OUTPATIENT)
Dept: RHEUMATOLOGY | Facility: CLINIC | Age: 70
End: 2025-09-04
Payer: COMMERCIAL

## 2025-09-17 ENCOUNTER — APPOINTMENT (OUTPATIENT)
Dept: RHEUMATOLOGY | Facility: CLINIC | Age: 70
End: 2025-09-17
Payer: COMMERCIAL

## 2025-09-29 ENCOUNTER — APPOINTMENT (OUTPATIENT)
Dept: RHEUMATOLOGY | Facility: CLINIC | Age: 70
End: 2025-09-29
Payer: COMMERCIAL

## 2025-12-15 ENCOUNTER — APPOINTMENT (OUTPATIENT)
Dept: RHEUMATOLOGY | Facility: CLINIC | Age: 70
End: 2025-12-15
Payer: COMMERCIAL